# Patient Record
Sex: FEMALE | Race: WHITE | NOT HISPANIC OR LATINO | URBAN - METROPOLITAN AREA
[De-identification: names, ages, dates, MRNs, and addresses within clinical notes are randomized per-mention and may not be internally consistent; named-entity substitution may affect disease eponyms.]

---

## 2017-06-30 ENCOUNTER — OUTPATIENT (OUTPATIENT)
Dept: OUTPATIENT SERVICES | Facility: HOSPITAL | Age: 67
LOS: 1 days | End: 2017-06-30

## 2017-06-30 ENCOUNTER — APPOINTMENT (OUTPATIENT)
Dept: NUCLEAR MEDICINE | Facility: CLINIC | Age: 67
End: 2017-06-30

## 2017-06-30 DIAGNOSIS — Z00.8 ENCOUNTER FOR OTHER GENERAL EXAMINATION: ICD-10-CM

## 2017-08-15 ENCOUNTER — RECORD ABSTRACTING (OUTPATIENT)
Age: 67
End: 2017-08-15

## 2017-08-15 DIAGNOSIS — E78.5 HYPERLIPIDEMIA, UNSPECIFIED: ICD-10-CM

## 2017-08-15 DIAGNOSIS — Z92.89 PERSONAL HISTORY OF OTHER MEDICAL TREATMENT: ICD-10-CM

## 2017-08-15 RX ORDER — MULTIVITAMIN
TABLET ORAL
Refills: 0 | Status: ACTIVE | COMMUNITY

## 2017-08-15 RX ORDER — POTASSIUM CHLORIDE 750 MG/1
10 TABLET, EXTENDED RELEASE ORAL
Refills: 0 | Status: ACTIVE | COMMUNITY

## 2017-08-15 RX ORDER — ROSUVASTATIN CALCIUM 10 MG/1
10 TABLET, FILM COATED ORAL
Refills: 0 | Status: ACTIVE | COMMUNITY

## 2017-08-15 RX ORDER — ASPIRIN 81 MG/1
81 TABLET, COATED ORAL
Refills: 0 | Status: ACTIVE | COMMUNITY

## 2017-08-15 RX ORDER — LOSARTAN POTASSIUM 100 MG/1
100 TABLET, FILM COATED ORAL
Refills: 0 | Status: ACTIVE | COMMUNITY

## 2017-09-21 ENCOUNTER — APPOINTMENT (OUTPATIENT)
Dept: NEUROLOGY | Facility: CLINIC | Age: 67
End: 2017-09-21
Payer: MEDICARE

## 2017-09-21 VITALS
DIASTOLIC BLOOD PRESSURE: 70 MMHG | WEIGHT: 293 LBS | SYSTOLIC BLOOD PRESSURE: 140 MMHG | BODY MASS INDEX: 43.4 KG/M2 | HEIGHT: 69 IN

## 2017-09-21 DIAGNOSIS — R25.1 TREMOR, UNSPECIFIED: ICD-10-CM

## 2017-09-21 DIAGNOSIS — I10 ESSENTIAL (PRIMARY) HYPERTENSION: ICD-10-CM

## 2017-09-21 DIAGNOSIS — Z86.39 PERSONAL HISTORY OF OTHER ENDOCRINE, NUTRITIONAL AND METABOLIC DISEASE: ICD-10-CM

## 2017-09-21 PROCEDURE — 99213 OFFICE O/P EST LOW 20 MIN: CPT

## 2018-01-18 ENCOUNTER — APPOINTMENT (OUTPATIENT)
Dept: NEUROLOGY | Facility: CLINIC | Age: 68
End: 2018-01-18

## 2021-06-07 ENCOUNTER — APPOINTMENT (OUTPATIENT)
Dept: ORTHOPEDIC SURGERY | Facility: CLINIC | Age: 71
End: 2021-06-07
Payer: MEDICARE

## 2021-06-07 VITALS
BODY MASS INDEX: 43.4 KG/M2 | HEIGHT: 69 IN | DIASTOLIC BLOOD PRESSURE: 79 MMHG | HEART RATE: 75 BPM | SYSTOLIC BLOOD PRESSURE: 137 MMHG | WEIGHT: 293 LBS

## 2021-06-07 DIAGNOSIS — M17.12 UNILATERAL PRIMARY OSTEOARTHRITIS, LEFT KNEE: ICD-10-CM

## 2021-06-07 PROCEDURE — 99204 OFFICE O/P NEW MOD 45 MIN: CPT

## 2021-06-07 PROCEDURE — 73562 X-RAY EXAM OF KNEE 3: CPT | Mod: 50

## 2021-06-07 RX ORDER — (INSULIN DEGLUDEC AND LIRAGLUTIDE) 100; 3.6 [IU]/ML; MG/ML
100-3.6 INJECTION, SOLUTION SUBCUTANEOUS
Qty: 45 | Refills: 0 | Status: ACTIVE | COMMUNITY
Start: 2021-01-13

## 2021-06-07 RX ORDER — EMPAGLIFLOZIN 25 MG/1
25 TABLET, FILM COATED ORAL
Qty: 90 | Refills: 0 | Status: ACTIVE | COMMUNITY
Start: 2021-03-01

## 2021-06-07 RX ORDER — FLUCONAZOLE 100 MG/1
100 TABLET ORAL
Qty: 7 | Refills: 0 | Status: ACTIVE | COMMUNITY
Start: 2021-05-25

## 2021-06-07 RX ORDER — CLOBETASOL PROPIONATE 0.5 MG/G
0.05 AEROSOL, FOAM TOPICAL
Qty: 50 | Refills: 0 | Status: ACTIVE | COMMUNITY
Start: 2021-05-21

## 2021-06-07 RX ORDER — FUROSEMIDE 40 MG/1
40 TABLET ORAL
Qty: 90 | Refills: 0 | Status: ACTIVE | COMMUNITY
Start: 2021-04-28

## 2021-06-07 RX ORDER — POTASSIUM CHLORIDE 750 MG/1
10 TABLET, FILM COATED, EXTENDED RELEASE ORAL
Qty: 90 | Refills: 0 | Status: ACTIVE | COMMUNITY
Start: 2021-04-12

## 2021-06-07 RX ORDER — PEN NEEDLE, DIABETIC 29 G X1/2"
32G X 4 MM NEEDLE, DISPOSABLE MISCELLANEOUS
Qty: 100 | Refills: 0 | Status: ACTIVE | COMMUNITY
Start: 2021-05-04

## 2021-09-13 ENCOUNTER — APPOINTMENT (OUTPATIENT)
Dept: ORTHOPEDIC SURGERY | Facility: CLINIC | Age: 71
End: 2021-09-13

## 2023-05-15 ENCOUNTER — OUTPATIENT (OUTPATIENT)
Dept: OUTPATIENT SERVICES | Facility: HOSPITAL | Age: 73
LOS: 1 days | End: 2023-05-15
Payer: MEDICARE

## 2023-05-15 DIAGNOSIS — G47.33 OBSTRUCTIVE SLEEP APNEA (ADULT) (PEDIATRIC): ICD-10-CM

## 2023-05-15 PROCEDURE — 95810 POLYSOM 6/> YRS 4/> PARAM: CPT | Mod: 26

## 2023-05-15 PROCEDURE — 95810 POLYSOM 6/> YRS 4/> PARAM: CPT

## 2023-06-14 ENCOUNTER — APPOINTMENT (OUTPATIENT)
Dept: ORTHOPEDIC SURGERY | Facility: CLINIC | Age: 73
End: 2023-06-14
Payer: MEDICARE

## 2023-06-14 VITALS — HEART RATE: 73 BPM | SYSTOLIC BLOOD PRESSURE: 141 MMHG | DIASTOLIC BLOOD PRESSURE: 74 MMHG

## 2023-06-14 PROCEDURE — 73562 X-RAY EXAM OF KNEE 3: CPT | Mod: 26,RT

## 2023-06-14 PROCEDURE — 99214 OFFICE O/P EST MOD 30 MIN: CPT

## 2023-06-16 NOTE — PHYSICAL EXAM
[Antalgic] : antalgic [Cane] : ambulates with cane [de-identified] : GENERAL APPEARANCE: Well nourished and hydrated, pleasant, alert, and oriented x 3. Appears their stated age. \par HEENT: Normocephalic, extraocular eye motion intact. Nasal septum midline. Oral cavity clear. External auditory canal clear. \par RESPIRATORY: Breath sounds clear and audible in all lobes. No wheezing, No accessory muscle use.\par CARDIOVASCULAR: No apparent abnormalities. No lower leg edema. No varicosities. Pedal pulses are palpable.\par NEUROLOGIC: Sensation is normal, no muscle weakness in the upper or lower extremities.\par DERMATOLOGIC: No apparent skin lesions, moist, warm, no rash.\par SPINE: Cervical spine appears normal and moves freely; thoracic spine appears normal and moves freely; lumbosacral spine appears normal and moves freely, normal, nontender.\par MUSCULOSKELETAL: Hands, wrists, and elbows are normal and move freely, shoulders are normal and move freely. \par Musculoskeletal\par 5/5 motor strength in bilateral lower extremities. Sensory: Intact in bilateral lower extremities. DTRs: Biceps, brachioradialis, triceps, patellar, ankle and plantar 2+ and symmetric bilaterally. Pulses: dorsalis pedis, posterior tibial, femoral, popliteal, and radial 2+ and symmetric bilaterally. \par Constitutional: Alert and in no acute distress, but well-appearing. \par  [de-identified] : right knee exam showed varus alligment, medial joint line tenderness and pain. ROM is 0-100 degree\par  no effusion  [de-identified] : 3 views right knee x-rays demonstrate medial compartment bone-on-bone osteoarthritis

## 2023-06-16 NOTE — DISCUSSION/SUMMARY
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  She failed 2 injections that has been done in another practice.  She is beginning to have a mechanical symptoms of severe osteoarthritis she is walking with a cane.  She reports significantly decreased quality of life she is a candidate for right knee replacement and she is interested.  patient is anticipating gastric sleeve surgery soon for weight loss which I think is the right thing to do to decrease the risk for surgical complication.  Patient will call our office to schedule right total knee arthroplasty when she is recovered from weight loss surgery  cleared by GI.  Patient also has history of diabetes and she is aware to maintain her A1c less than 8.0 to proceed with right total knee arthroplasty.  She deferred injection today since it has failed  providing relief in the past.\par She will continue with over-the-counter pain medications ice and topical pain cream.  She is allowed to continue with low impact exercise .\par We will see her back 2 weeks prior to her surgery.\par \par The patient is a  73 year individual with end stage arthritis of their right knee joint. Based upon the patient's continued symptoms and failure to respond to conservative treatment , including Tylenol and anti-inflammatories, physical therapy, activity modification , local modalities such as apply ice or heat, and injections. I have recommended a total knee arthroplasty for this patient. A long discussion took place with the patient describing what a total joint replacement is and what the procedure would entail. A total knee arthroplasty model, similar to the implant that was used during the operation, was utilized to demonstrate and to discuss the various bearing surfaces of the implants. The hospitalization and post-operative care and rehabilitation were also discussed. The use of perioperative antibiotics and DVT prophylaxis were discussed. The risk, benefits and alternatives to a surgical intervention were discussed at length with the patient. The patient was also advised of risks related to the medical comorbidities, elevated body mass index (BMI), and smoking where applicable. We discussed how to reduce modifiable risk factors and encouraged smoking cessation were applicable.. A lengthy discussion took place to review the most common complications including but not limited to: deep vein thrombosis, pulmonary embolus, heart attack, stroke, infection, wound breakdown, numbness, damage to nerves, tendon, muscles, arteries or other blood vessels, death and other possible complications from anesthesia. The patient was told that we will take steps to minimize these risks by using sterile technique, antibiotics and DVT prophylaxis when appropriate and follow the patient postoperatively in the office setting to monitor progress. The possibility of recurrent pain, no improvement in pain and actual worsening of pain were also discussed with the patient.\par The discharge plan of care focused on the patient going home following surgery. The patient was encouraged to make the necessary arrangements to have someone stay with them when they are discharged home. Following discharge, a home care nurse was to the patient. The home care nurse would open the patient´s home care case and request home physical therapy services. Home physical therapy was to commence following discharge provided it was appropriate and covered by the health insurance benefit plan. \par The benefits of surgery were discussed with the patient including the potential for improving her current clinical condition through operative intervention. Alternatives to surgical intervention including continued conservative management were also discussed in detail. All questions were answered to the satisfaction of the patient. The treatment plan of care, as well as a model of a total knee arthroplasty equivalent to the one that will be used for their total joint replacement, was shared with the patient. The patient agreed to the plan of care as well as the use of implants in their total joint replacement. \par

## 2023-06-16 NOTE — HISTORY OF PRESENT ILLNESS
[Pain Location] : pain [Worsening] : worsening [___ yrs] : [unfilled] year(s) ago [6] : a current pain level of 6/10 [7] : a maximum pain level of 7/10 [de-identified] : This is a 73-year-old female with progressively worsening right knee pain.  She complains of right lower leg swelling .\par She began to feel buckling so she is using cane she is afraid of falling.\par She has been getting [facility] which used to help but she is no longer getting pain relief.\par She was seen in our office in 2014 and 2021. \par Patient has history of diabetes.  She is anticipating gastric sleeve surgery for weight loss

## 2023-07-27 RX ORDER — NABUMETONE 500 MG/1
500 TABLET, FILM COATED ORAL
Qty: 60 | Refills: 0 | Status: ACTIVE | COMMUNITY
Start: 2023-07-27 | End: 1900-01-01

## 2023-07-28 ENCOUNTER — APPOINTMENT (OUTPATIENT)
Dept: PULMONOLOGY | Facility: CLINIC | Age: 73
End: 2023-07-28
Payer: MEDICARE

## 2023-07-28 VITALS
HEART RATE: 64 BPM | SYSTOLIC BLOOD PRESSURE: 122 MMHG | OXYGEN SATURATION: 97 % | WEIGHT: 293 LBS | RESPIRATION RATE: 16 BRPM | DIASTOLIC BLOOD PRESSURE: 76 MMHG | BODY MASS INDEX: 44.41 KG/M2 | HEIGHT: 68 IN

## 2023-07-28 DIAGNOSIS — Z80.0 FAMILY HISTORY OF MALIGNANT NEOPLASM OF DIGESTIVE ORGANS: ICD-10-CM

## 2023-07-28 DIAGNOSIS — Z82.49 FAMILY HISTORY OF ISCHEMIC HEART DISEASE AND OTHER DISEASES OF THE CIRCULATORY SYSTEM: ICD-10-CM

## 2023-07-28 DIAGNOSIS — Z80.3 FAMILY HISTORY OF MALIGNANT NEOPLASM OF BREAST: ICD-10-CM

## 2023-07-28 DIAGNOSIS — S42.302A UNSPECIFIED FRACTURE OF SHAFT OF HUMERUS, LEFT ARM, INITIAL ENCOUNTER FOR CLOSED FRACTURE: ICD-10-CM

## 2023-07-28 DIAGNOSIS — Z63.4 DISAPPEARANCE AND DEATH OF FAMILY MEMBER: ICD-10-CM

## 2023-07-28 DIAGNOSIS — Z01.811 ENCOUNTER FOR PREPROCEDURAL RESPIRATORY EXAMINATION: ICD-10-CM

## 2023-07-28 DIAGNOSIS — Z86.16 PERSONAL HISTORY OF COVID-19: ICD-10-CM

## 2023-07-28 DIAGNOSIS — Z86.79 PERSONAL HISTORY OF OTHER DISEASES OF THE CIRCULATORY SYSTEM: ICD-10-CM

## 2023-07-28 DIAGNOSIS — Z87.891 PERSONAL HISTORY OF NICOTINE DEPENDENCE: ICD-10-CM

## 2023-07-28 PROCEDURE — 99204 OFFICE O/P NEW MOD 45 MIN: CPT

## 2023-07-28 RX ORDER — DOCUSATE CALCIUM 240 MG
CAPSULE ORAL
Refills: 0 | Status: ACTIVE | COMMUNITY

## 2023-07-28 RX ORDER — GLIMEPIRIDE 2 MG/1
2 TABLET ORAL
Refills: 0 | Status: ACTIVE | COMMUNITY

## 2023-07-28 RX ORDER — METOPROLOL TARTRATE 75 MG/1
TABLET, FILM COATED ORAL
Refills: 0 | Status: ACTIVE | COMMUNITY

## 2023-07-28 SDOH — SOCIAL STABILITY - SOCIAL INSECURITY: DISSAPEARANCE AND DEATH OF FAMILY MEMBER: Z63.4

## 2023-07-28 NOTE — HISTORY OF PRESENT ILLNESS
[Excessive Daytime Sleepiness] : excessive daytime sleepiness [Sleepy When Sedentary] : sleepy when sedentary [Initial Evaluation] : an initial evaluation of [Excess Weight] : excess weight [Currently Experiencing] : The patient is currently experiencing symptoms. [Dyspnea] : dyspnea [Knee Pain] : knee pain [Low Calorie Diet] : low calorie diet [Fair Compliance] : fair compliance with treatment [Fair Tolerance] : fair tolerance of treatment [Fair Symptom Control] : fair symptom control [Dyslipidemia] : dyslipidemia [Sleep Apnea] : sleep apnea [Diabetes] : diabetes [Hypertension] : hypertension [Coronary Artery Disease] : coronary artery disease [TextBox_4] : Former smoker of 1-2 ppd x 20 years, quit 1983\par S/p Covid infection 11/2021 and again in 2022 but with mild symptoms and did not require therapy.\par H/o MI 4/2022 and was evaluated by cardiology.\par The patient presents for pulmonary evaluation prior to bariatric surgery. \par  [On ___] : performed on [unfilled] [Patient] : the patient [Indication ___] : for an indication of [unfilled] [None] : no new symptoms reported [FreeTextEntry9] : CXR [FreeTextEntry8] : R midlung calcified granuloma [ESS] : 1 [TextBox_11] : 3

## 2023-07-28 NOTE — CONSULT LETTER
[Dear  ___] : Dear  [unfilled], [Consult Letter:] : I had the pleasure of evaluating your patient, [unfilled]. [Please see my note below.] : Please see my note below. [Consult Closing:] : Thank you very much for allowing me to participate in the care of this patient.  If you have any questions, please do not hesitate to contact me. [Sincerely,] : Sincerely, [FreeTextEntry3] : Garcia Lanier MD, FCCP, D. ABSM\par Pulmonary and Sleep Medicine\par [facility] [place]

## 2023-07-28 NOTE — PHYSICAL EXAM
[No Acute Distress] : no acute distress [Normal Appearance] : normal appearance [Supple] : supple [Normal Rate/Rhythm] : normal rate/rhythm [Normal S1, S2] : normal s1, s2 [No Murmurs] : no murmurs [No Resp Distress] : no resp distress [No Acc Muscle Use] : no acc muscle use [Normal Rhythm and Effort] : normal rhythm and effort [Clear to Auscultation Bilaterally] : clear to auscultation bilaterally [No Abnormalities] : no abnormalities [Benign] : benign [Not Tender] : not tender [Soft] : soft [No Clubbing] : no clubbing [1+ Pitting] : 1+ pitting [No Focal Deficits] : no focal deficits [Oriented x3] : oriented x3

## 2023-07-28 NOTE — REASON FOR VISIT
[Initial] : an initial visit [Sleep Apnea] : sleep apnea [Shortness of Breath] : shortness of breath [Pre-op Risk Stratification] : pre-op risk stratification [Obesity] : obesity [TextBox_44] : prior Covid infection, smoking hx

## 2023-07-28 NOTE — DISCUSSION/SUMMARY
[FreeTextEntry1] : \par #1. Will schedule PFTs in near future to assess lung function \par #2. The patient does not appear to require chronic BD therapy at this time\par #3. Diet and exercise for weight loss\par #4. SOBOE is likely at least somewhat related to weight or deconditioning \par #5. CXR from 5/30/23 was clear with R midlung calcified granuloma; repeat prn\par #6. 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 Replace PAP equipment as needed; ordered 7/28/23\par #8. Further pre-op recommendations will be forthcoming pending the above w/u\par #9. Pt had both Covid vaccines and boosters; s/p Covid infection\par #10. F/u as soon as can be scheduled with compliance and PFTs\par \par The patient expressed understanding and agreement with the above recommendations/plan and accepts responsibility to be compliant with recommended testing, therapies, and f/u visits.\par All relevant questions and concerns were addressed.

## 2023-07-28 NOTE — REVIEW OF SYSTEMS
[SOB on Exertion] : sob on exertion [Edema] : edema [Diabetes] : diabetes [Obesity] : obesity [Negative] : Psychiatric [TextBox_94] : Knee pain

## 2023-07-31 ENCOUNTER — APPOINTMENT (OUTPATIENT)
Dept: ORTHOPEDIC SURGERY | Facility: CLINIC | Age: 73
End: 2023-07-31

## 2023-08-07 ENCOUNTER — OUTPATIENT (OUTPATIENT)
Dept: OUTPATIENT SERVICES | Facility: HOSPITAL | Age: 73
LOS: 1 days | End: 2023-08-07
Payer: MEDICARE

## 2023-08-07 VITALS
WEIGHT: 293 LBS | SYSTOLIC BLOOD PRESSURE: 140 MMHG | TEMPERATURE: 98 F | RESPIRATION RATE: 16 BRPM | HEART RATE: 79 BPM | HEIGHT: 68 IN | OXYGEN SATURATION: 97 % | DIASTOLIC BLOOD PRESSURE: 75 MMHG

## 2023-08-07 DIAGNOSIS — I10 ESSENTIAL (PRIMARY) HYPERTENSION: ICD-10-CM

## 2023-08-07 DIAGNOSIS — E11.9 TYPE 2 DIABETES MELLITUS WITHOUT COMPLICATIONS: ICD-10-CM

## 2023-08-07 DIAGNOSIS — Z98.890 OTHER SPECIFIED POSTPROCEDURAL STATES: Chronic | ICD-10-CM

## 2023-08-07 DIAGNOSIS — M19.90 UNSPECIFIED OSTEOARTHRITIS, UNSPECIFIED SITE: ICD-10-CM

## 2023-08-07 DIAGNOSIS — M17.11 UNILATERAL PRIMARY OSTEOARTHRITIS, RIGHT KNEE: ICD-10-CM

## 2023-08-07 DIAGNOSIS — Z01.818 ENCOUNTER FOR OTHER PREPROCEDURAL EXAMINATION: ICD-10-CM

## 2023-08-07 DIAGNOSIS — Z29.9 ENCOUNTER FOR PROPHYLACTIC MEASURES, UNSPECIFIED: ICD-10-CM

## 2023-08-07 LAB
A1C WITH ESTIMATED AVERAGE GLUCOSE RESULT: 8.2 % — HIGH (ref 4–5.6)
ANION GAP SERPL CALC-SCNC: 9 MMOL/L — SIGNIFICANT CHANGE UP (ref 5–17)
APTT BLD: 33 SEC — SIGNIFICANT CHANGE UP (ref 24.5–35.6)
BASOPHILS # BLD AUTO: 0.05 K/UL — SIGNIFICANT CHANGE UP (ref 0–0.2)
BASOPHILS NFR BLD AUTO: 0.8 % — SIGNIFICANT CHANGE UP (ref 0–2)
BLD GP AB SCN SERPL QL: SIGNIFICANT CHANGE UP
BUN SERPL-MCNC: 21.9 MG/DL — HIGH (ref 8–20)
CALCIUM SERPL-MCNC: 9 MG/DL — SIGNIFICANT CHANGE UP (ref 8.4–10.5)
CHLORIDE SERPL-SCNC: 101 MMOL/L — SIGNIFICANT CHANGE UP (ref 96–108)
CO2 SERPL-SCNC: 28 MMOL/L — SIGNIFICANT CHANGE UP (ref 22–29)
CREAT SERPL-MCNC: 0.92 MG/DL — SIGNIFICANT CHANGE UP (ref 0.5–1.3)
EGFR: 65 ML/MIN/1.73M2 — SIGNIFICANT CHANGE UP
EOSINOPHIL # BLD AUTO: 0.13 K/UL — SIGNIFICANT CHANGE UP (ref 0–0.5)
EOSINOPHIL NFR BLD AUTO: 2.1 % — SIGNIFICANT CHANGE UP (ref 0–6)
ESTIMATED AVERAGE GLUCOSE: 189 MG/DL — HIGH (ref 68–114)
GLUCOSE SERPL-MCNC: 378 MG/DL — HIGH (ref 70–99)
HCT VFR BLD CALC: 45.3 % — HIGH (ref 34.5–45)
HGB BLD-MCNC: 14.5 G/DL — SIGNIFICANT CHANGE UP (ref 11.5–15.5)
IMM GRANULOCYTES NFR BLD AUTO: 0.3 % — SIGNIFICANT CHANGE UP (ref 0–0.9)
INR BLD: 1.1 RATIO — SIGNIFICANT CHANGE UP (ref 0.85–1.18)
LYMPHOCYTES # BLD AUTO: 1.44 K/UL — SIGNIFICANT CHANGE UP (ref 1–3.3)
LYMPHOCYTES # BLD AUTO: 22.8 % — SIGNIFICANT CHANGE UP (ref 13–44)
MCHC RBC-ENTMCNC: 29.7 PG — SIGNIFICANT CHANGE UP (ref 27–34)
MCHC RBC-ENTMCNC: 32 GM/DL — SIGNIFICANT CHANGE UP (ref 32–36)
MCV RBC AUTO: 92.6 FL — SIGNIFICANT CHANGE UP (ref 80–100)
MONOCYTES # BLD AUTO: 0.47 K/UL — SIGNIFICANT CHANGE UP (ref 0–0.9)
MONOCYTES NFR BLD AUTO: 7.4 % — SIGNIFICANT CHANGE UP (ref 2–14)
MRSA PCR RESULT.: SIGNIFICANT CHANGE UP
NEUTROPHILS # BLD AUTO: 4.21 K/UL — SIGNIFICANT CHANGE UP (ref 1.8–7.4)
NEUTROPHILS NFR BLD AUTO: 66.6 % — SIGNIFICANT CHANGE UP (ref 43–77)
PLATELET # BLD AUTO: 135 K/UL — LOW (ref 150–400)
POTASSIUM SERPL-MCNC: 4.7 MMOL/L — SIGNIFICANT CHANGE UP (ref 3.5–5.3)
POTASSIUM SERPL-SCNC: 4.7 MMOL/L — SIGNIFICANT CHANGE UP (ref 3.5–5.3)
PROTHROM AB SERPL-ACNC: 12.2 SEC — SIGNIFICANT CHANGE UP (ref 9.5–13)
RBC # BLD: 4.89 M/UL — SIGNIFICANT CHANGE UP (ref 3.8–5.2)
RBC # FLD: 14 % — SIGNIFICANT CHANGE UP (ref 10.3–14.5)
S AUREUS DNA NOSE QL NAA+PROBE: SIGNIFICANT CHANGE UP
SODIUM SERPL-SCNC: 138 MMOL/L — SIGNIFICANT CHANGE UP (ref 135–145)
WBC # BLD: 6.32 K/UL — SIGNIFICANT CHANGE UP (ref 3.8–10.5)
WBC # FLD AUTO: 6.32 K/UL — SIGNIFICANT CHANGE UP (ref 3.8–10.5)

## 2023-08-07 PROCEDURE — 93005 ELECTROCARDIOGRAM TRACING: CPT

## 2023-08-07 PROCEDURE — 93010 ELECTROCARDIOGRAM REPORT: CPT

## 2023-08-07 PROCEDURE — G0463: CPT

## 2023-08-07 NOTE — H&P PST ADULT - NSANTHOSAYNRD_GEN_A_CORE
No. CABRERA screening performed.  STOP BANG Legend: 0-2 = LOW Risk; 3-4 = INTERMEDIATE Risk; 5-8 = HIGH Risk Yes

## 2023-08-07 NOTE — H&P PST ADULT - ASSESSMENT
CAPRINI SCORE    AGE RELATED RISK FACTORS                                                             [ ] Age 41-60 years                                            (1 Point)  [ ] Age: 61-74 years                                           (2 Points)                 [ ] Age= 75 years                                                (3 Points)             DISEASE RELATED RISK FACTORS                                                       [ ] Edema in the lower extremities                 (1 Point)                     [ ] Varicose veins                                               (1 Point)                                 [ ] BMI > 25 Kg/m2                                            (1 Point)                                  [ ] Serious infection (ie PNA)                            (1 Point)                     [ ] Lung disease ( COPD, Emphysema)            (1 Point)                                                                          [ ] Acute myocardial infarction                         (1 Point)                  [ ] Congestive heart failure (in the previous month)  (1 Point)         [ ] Inflammatory bowel disease                            (1 Point)                  [ ] Central venous access, PICC or Port               (2 points)       (within the last month)                                                                [ ] Stroke (in the previous month)                        (5 Points)    [ ] Previous or present malignancy                       (2 points)                                                                                                                                                         HEMATOLOGY RELATED FACTORS                                                         [ ] Prior episodes of VTE                                     (3 Points)                     [ ] Positive family history for VTE                      (3 Points)                  [ ] Prothrombin 86285 A                                     (3 Points)                     [ ] Factor V Leiden                                                (3 Points)                        [ ] Lupus anticoagulants                                      (3 Points)                                                           [ ] Anticardiolipin antibodies                              (3 Points)                                                       [ ] High homocysteine in the blood                   (3 Points)                                             [ ] Other congenital or acquired thrombophilia      (3 Points)                                                [ ] Heparin induced thrombocytopenia                  (3 Points)                                        MOBILITY RELATED FACTORS  [ ] Bed rest                                                         (1 Point)  [ ] Plaster cast                                                    (2 points)  [ ] Bed bound for more than 72 hours           (2 Points)    GENDER SPECIFIC FACTORS  [ ] Pregnancy or had a baby within the last month   (1 Point)  [ ] Post-partum < 6 weeks                                   (1 Point)  [ ] Hormonal therapy  or oral contraception   (1 Point)  [ ] History of pregnancy complications              (1 point)  [ ] Unexplained or recurrent              (1 Point)    OTHER RISK FACTORS                                           (1 Point)  [ ] BMI >40, smoking, diabetes requiring insulin, chemotherapy  blood transfusions and length of surgery over 2 hours    SURGERY RELATED RISK FACTORS  [ ]  Section within the last month     (1 Point)  [ ] Minor surgery                                                  (1 Point)  [ ] Arthroscopic surgery                                       (2 Points)  [ ] Planned major surgery lasting more            (2 Points)      than 45 minutes     [ ] Elective hip or knee joint replacement       (5 points)       surgery                                                TRAUMA RELATED RISK FACTORS  [ ] Fracture of the hip, pelvis, or leg                       (5 Points)  [ ] Spinal cord injury resulting in paralysis             (5 points)       (in the previous month)    [ ] Paralysis  (less than 1 month)                             (5 Points)  [ ] Multiple Trauma within 1 month                        (5 Points)    Total Score [        ]    Caprini Score 0-2: Low Risk, NO VTE prophylaxis required for most patients, encourage ambulation  Caprini Score 3-6: Moderate Risk , pharmacologic VTE prophylaxis is indicated for most patients (in the absence of contraindications)  Caprini Score Greater than or =7: High risk, pharmocologic VTE prophylaxis indicated for most patients (in the absence of contraindications)                              OPIOID RISK TOOL    AIMEE EACH BOX THAT APPLIES AND ADD TOTALS AT THE END    FAMILY HISTORY OF SUBSTANCE ABUSE                 FEMALE         MALE                                                Alcohol                             [  ]1 pt          [  ]3pts                                               Illegal Durgs                     [  ]2 pts        [  ]3pts                                               Rx Drugs                           [  ]4 pts        [  ]4 pts    PERSONAL HISTORY OF SUBSTANCE ABUSE                                                                                          Alcohol                             [  ]3 pts       [  ]3 pts                                               Illegal Drugs                     [  ]4 pts        [  ]4 pts                                               Rx Drugs                           [  ]5 pts        [  ]5 pts    AGE BETWEEN 16-45 YEARS                                      [  ]1 pt         [  ]1 pt    HISTORY OF PREADOLESCENT   SEXUAL ABUSE                                                             [  ]3 pts        [  ]0pts    PSYCHOLOGICAL DISEASE                     ADD, OCD, Bipolar, Schizophrenia        [  ]2 pts         [  ]2 pts                      Depression                                               [  ]1 pt           [  ]1 pt           SCORING TOTAL   (add numbers and type here)              (***)                                     A score of 3 or lower indicated LOW risk for future opioid abuse  A score of 4 to 7 indicated moderate risk for future opioid abuse  A score of 8 or higher indicates a high risk for opioid abuse     Pt is a 73 year old female seen pre-op today for right knee total joint replacement for unilateral primary osteoarthritis right knee. Pt reports longstanding bilateral knee pain for approximately 8 years (right knee pain worse than left knee pain). Pt reports sharp right knee pain 8/10 at maximum and 3/10 at rest. Pt denies trauma and falls. Reports the pain aggravated with ambulation, climbing up the stairs, weight bearin, activities. Pt requires the use of a cane to assist with mobility and stability. Pt reports prior conservative treatment of Physical therapy and steroid injection with mild relief. Pt states that she currently takes PO Tylenol to manage her pain with mild relief. Pt past medical history includes osteoarthritis of B/L knees, HLD, HTN, diabetes, MI 22, cardiac catheterization (no stent placement). Pt denies fevers, chills  and night sweat. Surgery scheduled for 2023 with Dr. Kerns. Pending medical and cardiac clearance    CAPRINI SCORE    AGE RELATED RISK FACTORS                                                             [ ] Age 41-60 years                                            (1 Point)  [ ] Age: 61-74 years                                           (2 Points)                 [ ] Age= 75 years                                                (3 Points)             DISEASE RELATED RISK FACTORS                                                       [ ] Edema in the lower extremities                 (1 Point)                     [ ] Varicose veins                                               (1 Point)                                 [ ] BMI > 25 Kg/m2                                            (1 Point)                                  [ ] Serious infection (ie PNA)                            (1 Point)                     [ ] Lung disease ( COPD, Emphysema)            (1 Point)                                                                          [ ] Acute myocardial infarction                         (1 Point)                  [ ] Congestive heart failure (in the previous month)  (1 Point)         [ ] Inflammatory bowel disease                            (1 Point)                  [ ] Central venous access, PICC or Port               (2 points)       (within the last month)                                                                [ ] Stroke (in the previous month)                        (5 Points)    [ ] Previous or present malignancy                       (2 points)                                                                                                                                                         HEMATOLOGY RELATED FACTORS                                                         [ ] Prior episodes of VTE                                     (3 Points)                     [ ] Positive family history for VTE                      (3 Points)                  [ ] Prothrombin 09007 A                                     (3 Points)                     [ ] Factor V Leiden                                                (3 Points)                        [ ] Lupus anticoagulants                                      (3 Points)                                                           [ ] Anticardiolipin antibodies                              (3 Points)                                                       [ ] High homocysteine in the blood                   (3 Points)                                             [ ] Other congenital or acquired thrombophilia      (3 Points)                                                [ ] Heparin induced thrombocytopenia                  (3 Points)                                        MOBILITY RELATED FACTORS  [ ] Bed rest                                                         (1 Point)  [ ] Plaster cast                                                    (2 points)  [ ] Bed bound for more than 72 hours           (2 Points)    GENDER SPECIFIC FACTORS  [ ] Pregnancy or had a baby within the last month   (1 Point)  [ ] Post-partum < 6 weeks                                   (1 Point)  [ ] Hormonal therapy  or oral contraception   (1 Point)  [ ] History of pregnancy complications              (1 point)  [ ] Unexplained or recurrent              (1 Point)    OTHER RISK FACTORS                                           (1 Point)  [ ] BMI >40, smoking, diabetes requiring insulin, chemotherapy  blood transfusions and length of surgery over 2 hours    SURGERY RELATED RISK FACTORS  [ ]  Section within the last month     (1 Point)  [ ] Minor surgery                                                  (1 Point)  [ ] Arthroscopic surgery                                       (2 Points)  [ ] Planned major surgery lasting more            (2 Points)      than 45 minutes     [ ] Elective hip or knee joint replacement       (5 points)       surgery                                                TRAUMA RELATED RISK FACTORS  [ ] Fracture of the hip, pelvis, or leg                       (5 Points)  [ ] Spinal cord injury resulting in paralysis             (5 points)       (in the previous month)    [ ] Paralysis  (less than 1 month)                             (5 Points)  [ ] Multiple Trauma within 1 month                        (5 Points)    Total Score [        ]    Caprini Score 0-2: Low Risk, NO VTE prophylaxis required for most patients, encourage ambulation  Caprini Score 3-6: Moderate Risk , pharmacologic VTE prophylaxis is indicated for most patients (in the absence of contraindications)  Caprini Score Greater than or =7: High risk, pharmocologic VTE prophylaxis indicated for most patients (in the absence of contraindications)                              OPIOID RISK TOOL    AIMEE EACH BOX THAT APPLIES AND ADD TOTALS AT THE END    FAMILY HISTORY OF SUBSTANCE ABUSE                 FEMALE         MALE                                                Alcohol                             [  ]1 pt          [  ]3pts                                               Illegal Durgs                     [  ]2 pts        [  ]3pts                                               Rx Drugs                           [  ]4 pts        [  ]4 pts    PERSONAL HISTORY OF SUBSTANCE ABUSE                                                                                          Alcohol                             [  ]3 pts       [  ]3 pts                                               Illegal Drugs                     [  ]4 pts        [  ]4 pts                                               Rx Drugs                           [  ]5 pts        [  ]5 pts    AGE BETWEEN 16-45 YEARS                                      [  ]1 pt         [  ]1 pt    HISTORY OF PREADOLESCENT   SEXUAL ABUSE                                                             [  ]3 pts        [  ]0pts    PSYCHOLOGICAL DISEASE                     ADD, OCD, Bipolar, Schizophrenia        [  ]2 pts         [  ]2 pts                      Depression                                               [  ]1 pt           [  ]1 pt           SCORING TOTAL   (add numbers and type here)              (***)                                     A score of 3 or lower indicated LOW risk for future opioid abuse  A score of 4 to 7 indicated moderate risk for future opioid abuse  A score of 8 or higher indicates a high risk for opioid abuse

## 2023-08-07 NOTE — H&P PST ADULT - SKIN
warm and dry/color normal/normal/no rashes/no ulcers warm and dry/color normal/no rashes/no ulcers warm and dry/color normal

## 2023-08-07 NOTE — H&P PST ADULT - NSICDXPASTMEDICALHX_GEN_ALL_CORE_FT
PAST MEDICAL HISTORY:  Diabetes     HTN (hypertension)      PAST MEDICAL HISTORY:  Diabetes     High blood cholesterol level     HTN (hypertension)     Osteoarthritis

## 2023-08-07 NOTE — H&P PST ADULT - HISTORY OF PRESENT ILLNESS
Pt is a 73 year old female seen pre-op today for right knee total joint replacement for unilateral primary osteoarthritis left knee. Pt reports longstanding bilateral knee pain for approximately 8 years (right knee pain worse than left knee pain). Pt reports aching right knee pain 8/10 at maximum and 3/10 at rest. Pt denies trauma and falls. Reports the pain aggravated with ambulation, climbing up the stairs, weight bearing, activities. Pt requires the use of a cane to assist with mobility and stability. Pt reports prior conservative treatment of Physical therapy and steroid injection with mild relief. Pt states that she currently takes PO Tylenol to manage her pain with mild relief. Pt past medical history includes osteoarthritis of both knees, HLD, HTN, anxiety,  Pt denies fevers, chills  and night sweat. Surgery scheduled for 8/24/2023 with Dr. Kerns Pt is a 73 year old female seen pre-op today for right knee total joint replacement for unilateral primary osteoarthritis right knee. Pt reports longstanding bilateral knee pain for approximately 8 years (right knee pain worse than left knee pain). Pt reports sharp right knee pain 8/10 at maximum and 3/10 at rest. Pt denies trauma and falls. Reports the pain aggravated with ambulation, climbing up the stairs, weight bearing, activities. Pt requires the use of a cane to assist with mobility and stability. Pt reports prior conservative treatment of Physical therapy and steroid injection with mild relief. Pt states that she currently takes PO Tylenol to manage her pain with mild relief. Pt past medical history includes osteoarthritis of both knees, HLD, HTN, diabetes, MI 6/6/22, cardiac catheterization (no stent placement). Pt denies fevers, chills  and night sweat. Surgery scheduled for 8/24/2023 with Dr. Kerns Pt is a 73 year old female seen pre-op today for right knee total joint replacement for unilateral primary osteoarthritis right knee. Pt reports longstanding bilateral knee pain for approximately 8 years (right knee pain worse than left knee pain). Pt reports sharp right knee pain 8/10 at maximum and 3/10 at rest. Pt denies trauma and falls. Reports the pain aggravated with ambulation, climbing up the stairs, weight bearin, activities. Pt requires the use of a cane to assist with mobility and stability. Pt reports prior conservative treatment of Physical therapy and steroid injection with mild relief. Pt states that she currently takes PO Tylenol to manage her pain with mild relief. Pt past medical history includes osteoarthritis of B/L knees, HLD, HTN, diabetes, MI 6/6/22, cardiac catheterization (no stent placement). Pt denies fevers, chills  and night sweat. Surgery scheduled for 8/24/2023 with Dr. Kerns. Pending medical and cardiac clearance

## 2023-08-09 ENCOUNTER — APPOINTMENT (OUTPATIENT)
Dept: PULMONOLOGY | Facility: CLINIC | Age: 73
End: 2023-08-09
Payer: MEDICARE

## 2023-08-09 VITALS
DIASTOLIC BLOOD PRESSURE: 64 MMHG | RESPIRATION RATE: 16 BRPM | OXYGEN SATURATION: 91 % | SYSTOLIC BLOOD PRESSURE: 126 MMHG | HEART RATE: 81 BPM

## 2023-08-09 VITALS — WEIGHT: 293 LBS | BODY MASS INDEX: 44.41 KG/M2 | HEIGHT: 68 IN

## 2023-08-09 DIAGNOSIS — R06.02 SHORTNESS OF BREATH: ICD-10-CM

## 2023-08-09 DIAGNOSIS — G47.33 OBSTRUCTIVE SLEEP APNEA (ADULT) (PEDIATRIC): ICD-10-CM

## 2023-08-09 PROBLEM — E78.00 PURE HYPERCHOLESTEROLEMIA, UNSPECIFIED: Chronic | Status: ACTIVE | Noted: 2023-08-07

## 2023-08-09 PROBLEM — M19.90 UNSPECIFIED OSTEOARTHRITIS, UNSPECIFIED SITE: Chronic | Status: ACTIVE | Noted: 2023-08-07

## 2023-08-09 PROBLEM — I10 ESSENTIAL (PRIMARY) HYPERTENSION: Chronic | Status: ACTIVE | Noted: 2023-08-07

## 2023-08-09 PROBLEM — E11.9 TYPE 2 DIABETES MELLITUS WITHOUT COMPLICATIONS: Chronic | Status: ACTIVE | Noted: 2023-08-07

## 2023-08-09 PROCEDURE — 94010 BREATHING CAPACITY TEST: CPT

## 2023-08-09 PROCEDURE — 99215 OFFICE O/P EST HI 40 MIN: CPT | Mod: 25

## 2023-08-09 NOTE — REASON FOR VISIT
[Follow-Up] : a follow-up visit [Pre-op Risk Stratification] : pre-op risk stratification [TextBox_44] : Gastric sleeve

## 2023-08-09 NOTE — HISTORY OF PRESENT ILLNESS
[Obstructive Sleep Apnea] : obstructive sleep apnea [Witnessed Apneas] : witnessed apneas [TextBox_4] : No complaints of cough wheeze or sputum production [Snoring] : no snoring [TextBox_77] : 1674 [TextBox_79] : 6852 [TextBox_81] : 10 [TextBox_83] : 0 [TextBox_100] : 5/12/2023 [TextBox_108] : 13.7 [TextBox_112] : 93.9 [TextBox_116] : 68 [TextBox_120] : REM 60.8

## 2023-08-09 NOTE — CONSULT LETTER
[Dear  ___] : Dear  [unfilled], [Consult Letter:] : I had the pleasure of evaluating your patient, [unfilled]. [Please see my note below.] : Please see my note below. [Consult Closing:] : Thank you very much for allowing me to participate in the care of this patient.  If you have any questions, please do not hesitate to contact me. [Sincerely,] : Sincerely, [FreeTextEntry3] : Sean Quintanilla MD FCCP Pulmonary/Critical Care/Sleep Medicine Department of Internal Medicine  [facility]

## 2023-08-09 NOTE — DISCUSSION/SUMMARY
[Obstructive Sleep Apnea] : obstructive sleep apnea [Mild] : mild [Alcohol Avoidance] : alcohol avoidance [Sedative Avoidance] : sedative avoidance [Weight Loss Program] : weight loss program [Patient] : discussed with the patient [de-identified] : severe in REM [de-identified] : CPAP was initiated with full explanation of the physiology behind treatment, compliance requirements and care of equipment. [FreeTextEntry1] : Prolonged hypoxemia most likely due to obstructive sleep apnea.  Patient will probably need an overnight oximetry following successful institution of her CPAP.  Clearance pending the above

## 2023-08-15 ENCOUNTER — NON-APPOINTMENT (OUTPATIENT)
Age: 73
End: 2023-08-15

## 2023-08-24 ENCOUNTER — APPOINTMENT (OUTPATIENT)
Dept: ORTHOPEDIC SURGERY | Facility: HOSPITAL | Age: 73
End: 2023-08-24

## 2023-09-15 ENCOUNTER — APPOINTMENT (OUTPATIENT)
Dept: ORTHOPEDIC SURGERY | Facility: CLINIC | Age: 73
End: 2023-09-15

## 2023-09-28 ENCOUNTER — APPOINTMENT (OUTPATIENT)
Dept: PULMONOLOGY | Facility: CLINIC | Age: 73
End: 2023-09-28

## 2023-11-01 ENCOUNTER — APPOINTMENT (OUTPATIENT)
Dept: ORTHOPEDIC SURGERY | Facility: CLINIC | Age: 73
End: 2023-11-01

## 2023-11-07 NOTE — H&P PST ADULT - GASTROINTESTINAL
Called the pt, she stated she faxed her continuity of care to [facility] 4 days ago. The pt told me that we were supposed to call and check on the status and expedite it. I then called [facility] and spoke with a representative that checked their work queues looking for everything that was faxed in, but they stated that they have not received anything from the pt. I called the pt back and informed her of this and provided her with the number that I called. Pt will call back once she calls them.     María Kc    normal/soft/nontender/nondistended/normal active bowel sounds negative

## 2023-11-22 ENCOUNTER — APPOINTMENT (OUTPATIENT)
Dept: PULMONOLOGY | Facility: CLINIC | Age: 73
End: 2023-11-22
Payer: MEDICARE

## 2023-11-22 ENCOUNTER — APPOINTMENT (OUTPATIENT)
Dept: ORTHOPEDIC SURGERY | Facility: CLINIC | Age: 73
End: 2023-11-22
Payer: MEDICARE

## 2023-11-22 VITALS
WEIGHT: 293 LBS | BODY MASS INDEX: 44.41 KG/M2 | DIASTOLIC BLOOD PRESSURE: 72 MMHG | HEART RATE: 74 BPM | HEIGHT: 68 IN | SYSTOLIC BLOOD PRESSURE: 147 MMHG

## 2023-11-22 VITALS
OXYGEN SATURATION: 95 % | RESPIRATION RATE: 16 BRPM | DIASTOLIC BLOOD PRESSURE: 60 MMHG | HEART RATE: 71 BPM | SYSTOLIC BLOOD PRESSURE: 120 MMHG

## 2023-11-22 VITALS — WEIGHT: 293 LBS | BODY MASS INDEX: 48.05 KG/M2

## 2023-11-22 DIAGNOSIS — Z01.818 ENCOUNTER FOR OTHER PREPROCEDURAL EXAMINATION: ICD-10-CM

## 2023-11-22 DIAGNOSIS — G47.33 OBSTRUCTIVE SLEEP APNEA (ADULT) (PEDIATRIC): ICD-10-CM

## 2023-11-22 DIAGNOSIS — M17.11 UNILATERAL PRIMARY OSTEOARTHRITIS, RIGHT KNEE: ICD-10-CM

## 2023-11-22 DIAGNOSIS — Z71.89 OTHER SPECIFIED COUNSELING: ICD-10-CM

## 2023-11-22 DIAGNOSIS — E66.01 MORBID (SEVERE) OBESITY DUE TO EXCESS CALORIES: ICD-10-CM

## 2023-11-22 DIAGNOSIS — E11.9 TYPE 2 DIABETES MELLITUS W/OUT COMPLICATIONS: ICD-10-CM

## 2023-11-22 PROCEDURE — 99214 OFFICE O/P EST MOD 30 MIN: CPT

## 2023-11-22 RX ORDER — SODIUM SULFATE, POTASSIUM SULFATE, MAGNESIUM SULFATE 17.5; 3.13; 1.6 G/ML; G/ML; G/ML
17.5-3.13-1.6 SOLUTION, CONCENTRATE ORAL
Qty: 354 | Refills: 0 | Status: COMPLETED | COMMUNITY
Start: 2021-01-28 | End: 2023-11-22

## 2023-11-22 RX ORDER — CANAGLIFLOZIN 300 MG/1
300 TABLET, FILM COATED ORAL
Refills: 0 | Status: COMPLETED | COMMUNITY
End: 2023-11-22

## 2023-12-10 ENCOUNTER — NON-APPOINTMENT (OUTPATIENT)
Age: 73
End: 2023-12-10

## 2024-01-12 ENCOUNTER — NON-APPOINTMENT (OUTPATIENT)
Age: 74
End: 2024-01-12

## 2024-03-15 ENCOUNTER — OUTPATIENT (OUTPATIENT)
Dept: OUTPATIENT SERVICES | Facility: HOSPITAL | Age: 74
LOS: 1 days | End: 2024-03-15
Payer: MEDICARE

## 2024-03-15 VITALS
WEIGHT: 275.58 LBS | TEMPERATURE: 98 F | OXYGEN SATURATION: 98 % | HEART RATE: 69 BPM | HEIGHT: 65 IN | DIASTOLIC BLOOD PRESSURE: 75 MMHG | SYSTOLIC BLOOD PRESSURE: 120 MMHG | RESPIRATION RATE: 16 BRPM

## 2024-03-15 DIAGNOSIS — Z01.818 ENCOUNTER FOR OTHER PREPROCEDURAL EXAMINATION: ICD-10-CM

## 2024-03-15 DIAGNOSIS — Z29.9 ENCOUNTER FOR PROPHYLACTIC MEASURES, UNSPECIFIED: ICD-10-CM

## 2024-03-15 DIAGNOSIS — I10 ESSENTIAL (PRIMARY) HYPERTENSION: ICD-10-CM

## 2024-03-15 DIAGNOSIS — Z98.891 HISTORY OF UTERINE SCAR FROM PREVIOUS SURGERY: Chronic | ICD-10-CM

## 2024-03-15 DIAGNOSIS — Z98.890 OTHER SPECIFIED POSTPROCEDURAL STATES: Chronic | ICD-10-CM

## 2024-03-15 DIAGNOSIS — M17.11 UNILATERAL PRIMARY OSTEOARTHRITIS, RIGHT KNEE: ICD-10-CM

## 2024-03-15 DIAGNOSIS — Z90.3 ACQUIRED ABSENCE OF STOMACH [PART OF]: Chronic | ICD-10-CM

## 2024-03-15 LAB
A1C WITH ESTIMATED AVERAGE GLUCOSE RESULT: 6.7 % — HIGH (ref 4–5.6)
ANION GAP SERPL CALC-SCNC: 12 MMOL/L — SIGNIFICANT CHANGE UP (ref 5–17)
APTT BLD: 31.9 SEC — SIGNIFICANT CHANGE UP (ref 24.5–35.6)
BASOPHILS # BLD AUTO: 0.04 K/UL — SIGNIFICANT CHANGE UP (ref 0–0.2)
BASOPHILS NFR BLD AUTO: 0.4 % — SIGNIFICANT CHANGE UP (ref 0–2)
BLD GP AB SCN SERPL QL: SIGNIFICANT CHANGE UP
BUN SERPL-MCNC: 11.7 MG/DL — SIGNIFICANT CHANGE UP (ref 8–20)
CALCIUM SERPL-MCNC: 9.6 MG/DL — SIGNIFICANT CHANGE UP (ref 8.4–10.5)
CHLORIDE SERPL-SCNC: 103 MMOL/L — SIGNIFICANT CHANGE UP (ref 96–108)
CO2 SERPL-SCNC: 26 MMOL/L — SIGNIFICANT CHANGE UP (ref 22–29)
CREAT SERPL-MCNC: 0.77 MG/DL — SIGNIFICANT CHANGE UP (ref 0.5–1.3)
EGFR: 81 ML/MIN/1.73M2 — SIGNIFICANT CHANGE UP
EOSINOPHIL # BLD AUTO: 0.06 K/UL — SIGNIFICANT CHANGE UP (ref 0–0.5)
EOSINOPHIL NFR BLD AUTO: 0.6 % — SIGNIFICANT CHANGE UP (ref 0–6)
ESTIMATED AVERAGE GLUCOSE: 146 MG/DL — HIGH (ref 68–114)
GLUCOSE SERPL-MCNC: 188 MG/DL — HIGH (ref 70–99)
HCT VFR BLD CALC: 42.1 % — SIGNIFICANT CHANGE UP (ref 34.5–45)
HGB BLD-MCNC: 14.3 G/DL — SIGNIFICANT CHANGE UP (ref 11.5–15.5)
IMM GRANULOCYTES NFR BLD AUTO: 0.4 % — SIGNIFICANT CHANGE UP (ref 0–0.9)
INR BLD: 1.17 RATIO — SIGNIFICANT CHANGE UP (ref 0.85–1.18)
LYMPHOCYTES # BLD AUTO: 1.75 K/UL — SIGNIFICANT CHANGE UP (ref 1–3.3)
LYMPHOCYTES # BLD AUTO: 17 % — SIGNIFICANT CHANGE UP (ref 13–44)
MCHC RBC-ENTMCNC: 31.2 PG — SIGNIFICANT CHANGE UP (ref 27–34)
MCHC RBC-ENTMCNC: 34 GM/DL — SIGNIFICANT CHANGE UP (ref 32–36)
MCV RBC AUTO: 91.7 FL — SIGNIFICANT CHANGE UP (ref 80–100)
MONOCYTES # BLD AUTO: 0.83 K/UL — SIGNIFICANT CHANGE UP (ref 0–0.9)
MONOCYTES NFR BLD AUTO: 8.1 % — SIGNIFICANT CHANGE UP (ref 2–14)
MRSA PCR RESULT.: SIGNIFICANT CHANGE UP
NEUTROPHILS # BLD AUTO: 7.58 K/UL — HIGH (ref 1.8–7.4)
NEUTROPHILS NFR BLD AUTO: 73.5 % — SIGNIFICANT CHANGE UP (ref 43–77)
PLATELET # BLD AUTO: 144 K/UL — LOW (ref 150–400)
POTASSIUM SERPL-MCNC: 4.7 MMOL/L — SIGNIFICANT CHANGE UP (ref 3.5–5.3)
POTASSIUM SERPL-SCNC: 4.7 MMOL/L — SIGNIFICANT CHANGE UP (ref 3.5–5.3)
PROTHROM AB SERPL-ACNC: 12.9 SEC — SIGNIFICANT CHANGE UP (ref 9.5–13)
RBC # BLD: 4.59 M/UL — SIGNIFICANT CHANGE UP (ref 3.8–5.2)
RBC # FLD: 15.2 % — HIGH (ref 10.3–14.5)
S AUREUS DNA NOSE QL NAA+PROBE: SIGNIFICANT CHANGE UP
SODIUM SERPL-SCNC: 141 MMOL/L — SIGNIFICANT CHANGE UP (ref 135–145)
WBC # BLD: 10.3 K/UL — SIGNIFICANT CHANGE UP (ref 3.8–10.5)
WBC # FLD AUTO: 10.3 K/UL — SIGNIFICANT CHANGE UP (ref 3.8–10.5)

## 2024-03-15 PROCEDURE — 93005 ELECTROCARDIOGRAM TRACING: CPT

## 2024-03-15 PROCEDURE — G0463: CPT

## 2024-03-15 PROCEDURE — 93010 ELECTROCARDIOGRAM REPORT: CPT

## 2024-03-15 RX ORDER — FUROSEMIDE 40 MG
1 TABLET ORAL
Refills: 0 | DISCHARGE

## 2024-03-15 RX ORDER — (INSULIN DEGLUDEC AND LIRAGLUTIDE) 100; 3.6 [IU]/ML; MG/ML
0 INJECTION, SOLUTION SUBCUTANEOUS
Refills: 0 | DISCHARGE

## 2024-03-15 RX ORDER — EMPAGLIFLOZIN 10 MG/1
1 TABLET, FILM COATED ORAL
Refills: 0 | DISCHARGE

## 2024-03-15 RX ORDER — GLIMEPIRIDE 1 MG
1 TABLET ORAL
Refills: 0 | DISCHARGE

## 2024-03-15 NOTE — H&P PST ADULT - HISTORY OF PRESENT ILLNESS
Pt is a 73-year-old female, seen today pre-op for Right knee total joint replacement for unilateral primary OA right knee. Pt reports right knee pain localized in the medial aspect of the knee, reports  "bone-on-bone"  medial compartmental osteoarthritis of the right knee. The pt was initially scheduled for a right TKA in August but was postponed for elevated hemoglobin A1c. She reports a month ago her A1c was 7.4 . Pt prior conservative treatment includes physical therapy  numerous cortisone injections without any significant improvement. Pt currently takes Tylenol and NSAIDs for pain with mild relief,   Pt is a 73-year-old female, seen today pre-op for Right knee total joint replacement for unilateral primary OA right knee. Pt reports right knee pain localized in the medial aspect of the knee, reports  "bone-on-bone"  medial compartmental osteoarthritis of the right knee. The pt was initially scheduled for a right TKA in August 2023 but was postponed for elevated hemoglobin A1c. Pt prior conservative treatment includes physical therapy  numerous cortisone injections without any significant improvement. Pt currently takes Tylenol and NSAIDs for pain with mild relief,  Pt s/p gastric sleeve 1/29/24 [facility]     Pt aggravated with lying down on the right, climbing up and down the stairs  DMT2(No current Pt is a 73-year-old female, seen today pre-op for Right knee total joint replacement for unilateral primary OA right  knee. Pt reports longstanding bilateral knee pain, right knee pain worse than the left knee. Denies any trauma and fall, states "bone-on-bone" reports right knee pain is localized to the medial aspect of the knee, pain is aggravated with weight bearing activity, walking, climbing up and down the stairs and lying on her right side. Pt prior conservative treatment includes physical therapy and cortisone injections without any significant improvement. Pt currently takes Tylenol and NSAIDs for pain with mild relief,  Pt was initially scheduled for this surgery in August 2023 but was rescheduled  for elevated hemoglobin A1c. Other medical hx includes morbidly obese BMI 45.1, DM(Current not on any medication s/p gastric sleeve 1/29/24 [facility]), CABRERA unable to tolerate CPAP machine, HTN, HLD, GERD, Chronic constipation. Past Hx MI.  Pt scheduled for this surgery on 4/4/24 with Dr. Kerns

## 2024-03-15 NOTE — H&P PST ADULT - NSICDXFAMILYHX_GEN_ALL_CORE_FT
FAMILY HISTORY:  Mother  Still living? No  FH: colon cancer, Age at diagnosis: Age Unknown    Sibling  Still living? Yes, Estimated age: Age Unknown  FHx: esophageal cancer, Age at diagnosis: Age Unknown

## 2024-03-15 NOTE — H&P PST ADULT - EKG AND INTERPRETATION
Normal sinus rhythm 69 bpm, possible left atrial enlargement, no acute change. EKG pending official reading

## 2024-03-15 NOTE — H&P PST ADULT - NSICDXPASTMEDICALHX_GEN_ALL_CORE_FT
PAST MEDICAL HISTORY:  Diabetes     High blood cholesterol level     HTN (hypertension)     Myocardial infarct     Osteoarthritis     Unilateral primary osteoarthritis, right knee      PAST MEDICAL HISTORY:  Bilateral knee pain     Diabetes     High blood cholesterol level     HTN (hypertension)     Morbidly obese     Myocardial infarct     Osteoarthritis     Unilateral primary osteoarthritis, right knee

## 2024-03-15 NOTE — H&P PST ADULT - ASSESSMENT
Pt is a 73-year-old female, seen today pre-op for Right knee total joint replacement for unilateral primary OA right  knee. Pt reports longstanding bilateral knee pain, right knee pain worse than the left knee. Denies any trauma and fall, states "bone-on-bone" reports right knee pain is localized to the medial aspect of the knee, pain is aggravated with weight bearing activity, walking, climbing up and down the stairs and lying on her right side. Pt prior conservative treatment includes physical therapy and cortisone injections without any significant improvement. Pt currently takes Tylenol and NSAIDs for pain with mild relief,  Pt was initially scheduled for this surgery in August 2023 but was rescheduled  for elevated hemoglobin A1c. Other medical hx includes morbidly obese BMI 45.1, DM(Current not on any medication s/p gastric sleeve 1/29/24 [facility]), CABRERA unable to tolerate CPAP machine, HTN, HLD, GERD, Chronic constipation. Past Hx MI.  Pt scheduled for this surgery on 4/4/24 with Dr. Kerns  Pt is a 73-year-old female, seen today pre-op for Right knee total joint replacement for unilateral primary OA right  knee. Pt reports longstanding bilateral knee pain, right knee pain worse than the left knee. Denies any trauma and fall, states "bone-on-bone" reports right knee pain is localized to the medial aspect of the knee, pain is aggravated with weight bearing activity, walking, climbing up and down the stairs and lying on her right side. Pt prior conservative treatment includes physical therapy and cortisone injections without any significant improvement. Pt currently takes Tylenol and NSAIDs for pain with mild relief,  Pt was initially scheduled for this surgery in 2023 but was rescheduled  for elevated hemoglobin A1c. Other medical hx includes morbidly obese BMI 45.1, DM(Current not on any medication s/p gastric sleeve 24 [facility]), CABRERA unable to tolerate CPAP machine, HTN, HLD, GERD, Chronic constipation. Past Hx MI.  Pt scheduled for this surgery on 24 with Dr. Kerns. Surgery protocol reviewed with Pt today, Pt to follow-up with PCP and cardiologist for evaluation and clearance and Cardiologist regarding recommendation for ASA prior to this surgery   CAPRINI VTE 2.0 SCORE [CLOT updated 2019]    AGE RELATED RISK FACTORS                                                       MOBILITY RELATED FACTORS  [ ] Age 41-60 years                                            (1 Point)                    [ ] Bed rest                                                        (1 Point)  [ ] Age: 61-74 years                                           (2 Points)                  [ ] Plaster cast                                                   (2 Points)  [ x] Age= 75 years                                              (3 Points)                    [ ] Bed bound for more than 72 hours                 (2 Points)    DISEASE RELATED RISK FACTORS                                               GENDER SPECIFIC FACTORS  [x ] Edema in the lower extremities                       (1 Point)              [ ] Pregnancy                                                     (1 Point)  [ ] Varicose veins                                               (1 Point)                     [ ] Post-partum < 6 weeks                                   (1 Point)             [ x] BMI > 25 Kg/m2                                            (1 Point)                     [ ] Hormonal therapy  or oral contraception          (1 Point)                 [ ] Sepsis (in the previous month)                        (1 Point)               [ ] History of pregnancy complications                 (1 point)  [ ] Pneumonia or serious lung disease                                               [ ] Unexplained or recurrent                     (1 Point)           (in the previous month)                               (1 Point)  [ ] Abnormal pulmonary function test                     (1 Point)                 SURGERY RELATED RISK FACTORS  [ ] Acute myocardial infarction                              (1 Point)               [ ]  Section                                             (1 Point)  [ ] Congestive heart failure (in the previous month)  (1 Point)      [ ] Minor surgery                                                  (1 Point)   [ ] Inflammatory bowel disease                             (1 Point)               [ ] Arthroscopic surgery                                        (2 Points)  [ ] Central venous access                                      (2 Points)                [ ] General surgery lasting more than 45 minutes (2 points)  [ ] Malignancy- Present or previous                   (2 Points)                [x ] Elective arthroplasty                                         (5 points)    [ ] Stroke (in the previous month)                          (5 Points)                                                                                                                                                           HEMATOLOGY RELATED FACTORS                                                 TRAUMA RELATED RISK FACTORS  [ ] Prior episodes of VTE                                     (3 Points)                [ ] Fracture of the hip, pelvis, or leg                       (5 Points)  [ ] Positive family history for VTE                         (3 Points)             [ ] Acute spinal cord injury (in the previous month)  (5 Points)  [ ] Prothrombin 18822 A                                     (3 Points)               [ ] Paralysis  (less than 1 month)                             (5 Points)  [ ] Factor V Leiden                                             (3 Points)                  [ ] Multiple Trauma within 1 month                        (5 Points)  [ ] Lupus anticoagulants                                     (3 Points)                                                           [ ] Anticardiolipin antibodies                               (3 Points)                                                       [ ] High homocysteine in the blood                      (3 Points)                                             [ ] Other congenital or acquired thrombophilia      (3 Points)                                                [ ] Heparin induced thrombocytopenia                  (3 Points)                                     Total Score [    10      ]    OPIOID RISK TOOL    AIMEE EACH BOX THAT APPLIES AND ADD TOTALS AT THE END    FAMILY HISTORY OF SUBSTANCE ABUSE                 FEMALE         MALE                                                Alcohol                             [  ]1 pt          [  ]3pts                                               Illegal Durgs                     [  ]2 pts        [  ]3pts                                               Rx Drugs                           [  ]4 pts        [  ]4 pts    PERSONAL HISTORY OF SUBSTANCE ABUSE                                                                                          Alcohol                             [  ]3 pts       [  ]3 pts                                               Illegal Drugs                     [  ]4 pts        [  ]4 pts                                               Rx Drugs                           [  ]5 pts        [  ]5 pts    AGE BETWEEN 16-45 YEARS                                      [  ]1 pt         [  ]1 pt    HISTORY OF PREADOLESCENT   SEXUAL ABUSE                                                             [  ]3 pts        [  ]0pts    PSYCHOLOGICAL DISEASE                     ADD, OCD, Bipolar, Schizophrenia        [  ]2 pts         [  ]2 pts                      Depression                                               [  ]1 pt           [  ]1 pt           SCORING TOTAL   (add numbers and type here)              (**0*)                                     A score of 3 or lower indicated LOW risk for future opioid abuse  A score of 4 to 7 indicated moderate risk for future opioid abuse  A score of 8 or higher indicates a high risk for opioid abuse

## 2024-03-15 NOTE — H&P PST ADULT - NSICDXPASTSURGICALHX_GEN_ALL_CORE_FT
PAST SURGICAL HISTORY:  H/O foot surgery     History of      History of surgery on arm     S/P gastric sleeve procedure

## 2024-03-15 NOTE — H&P PST ADULT - MUSCULOSKELETAL
details… right knee/decreased ROM/decreased ROM due to pain/joint swelling/decreased strength/abnormal gait

## 2024-03-27 NOTE — PROVIDER CONTACT NOTE (OTHER) - SITUATION
Attended joint education session on 3/27/2024 via online method with opportunity to ask questions. Contact information for follow up questions given.

## 2024-04-02 RX ORDER — POVIDONE-IODINE 5 %
1 AEROSOL (ML) TOPICAL ONCE
Refills: 0 | Status: COMPLETED | OUTPATIENT
Start: 2024-04-04 | End: 2024-04-04

## 2024-04-03 ENCOUNTER — TRANSCRIPTION ENCOUNTER (OUTPATIENT)
Age: 74
End: 2024-04-03

## 2024-04-03 NOTE — PATIENT PROFILE ADULT - FALL HARM RISK - HARM RISK INTERVENTIONS
Assistance with ambulation/Assistance OOB with selected safe patient handling equipment/Communicate Risk of Fall with Harm to all staff/Discuss with provider need for PT consult/Monitor gait and stability/Provide patient with walking aids - walker, cane, crutches/Reinforce activity limits and safety measures with patient and family/Sit up slowly, dangle for a short time, stand at bedside before walking/Tailored Fall Risk Interventions/Use of alarms - bed, chair and/or voice tab/Visual Cue: Yellow wristband and red socks/Bed in lowest position, wheels locked, appropriate side rails in place/Call bell, personal items and telephone in reach/Instruct patient to call for assistance before getting out of bed or chair/Non-slip footwear when patient is out of bed/[place] to call system/Physically safe environment - no spills, clutter or unnecessary equipment/Purposeful Proactive Rounding/Room/bathroom lighting operational, light cord in reach

## 2024-04-03 NOTE — PATIENT PROFILE ADULT - DEAF OR HARD OF HEARING?
CONSTITUTIONAL: +weakness  RESPIRATORY: No cough, wheezing, hemoptysis; No shortness of breath  CARDIOVASCULAR: No chest pain or palpitations  GASTROINTESTINAL: +LLQ cramping  GENITOURINARY: +vaginal bleeding no

## 2024-04-04 ENCOUNTER — INPATIENT (INPATIENT)
Facility: HOSPITAL | Age: 74
LOS: 0 days | Discharge: HOME | DRG: 470 | End: 2024-04-05
Attending: ORTHOPAEDIC SURGERY | Admitting: ORTHOPAEDIC SURGERY
Payer: COMMERCIAL

## 2024-04-04 ENCOUNTER — APPOINTMENT (OUTPATIENT)
Dept: ORTHOPEDIC SURGERY | Facility: HOSPITAL | Age: 74
End: 2024-04-04

## 2024-04-04 ENCOUNTER — TRANSCRIPTION ENCOUNTER (OUTPATIENT)
Age: 74
End: 2024-04-04

## 2024-04-04 VITALS
DIASTOLIC BLOOD PRESSURE: 79 MMHG | SYSTOLIC BLOOD PRESSURE: 142 MMHG | RESPIRATION RATE: 16 BRPM | WEIGHT: 275.58 LBS | TEMPERATURE: 97 F | HEART RATE: 78 BPM | HEIGHT: 69 IN

## 2024-04-04 DIAGNOSIS — Z98.890 OTHER SPECIFIED POSTPROCEDURAL STATES: Chronic | ICD-10-CM

## 2024-04-04 DIAGNOSIS — Z98.891 HISTORY OF UTERINE SCAR FROM PREVIOUS SURGERY: Chronic | ICD-10-CM

## 2024-04-04 DIAGNOSIS — M17.11 UNILATERAL PRIMARY OSTEOARTHRITIS, RIGHT KNEE: ICD-10-CM

## 2024-04-04 DIAGNOSIS — Z90.3 ACQUIRED ABSENCE OF STOMACH [PART OF]: Chronic | ICD-10-CM

## 2024-04-04 LAB
GLUCOSE BLDC GLUCOMTR-MCNC: 124 MG/DL — HIGH (ref 70–99)
GLUCOSE BLDC GLUCOMTR-MCNC: 143 MG/DL — HIGH (ref 70–99)
GLUCOSE BLDC GLUCOMTR-MCNC: 151 MG/DL — HIGH (ref 70–99)
GLUCOSE BLDC GLUCOMTR-MCNC: 227 MG/DL — HIGH (ref 70–99)
GLUCOSE BLDC GLUCOMTR-MCNC: 273 MG/DL — HIGH (ref 70–99)

## 2024-04-04 PROCEDURE — 27447 TOTAL KNEE ARTHROPLASTY: CPT | Mod: RT

## 2024-04-04 PROCEDURE — 20985 CPTR-ASST DIR MS PX: CPT

## 2024-04-04 PROCEDURE — 73560 X-RAY EXAM OF KNEE 1 OR 2: CPT | Mod: 26,RT

## 2024-04-04 PROCEDURE — 99222 1ST HOSP IP/OBS MODERATE 55: CPT

## 2024-04-04 PROCEDURE — 27447 TOTAL KNEE ARTHROPLASTY: CPT | Mod: AS,RT

## 2024-04-04 DEVICE — CEMENT PALACOS R: Type: IMPLANTABLE DEVICE | Site: RIGHT | Status: FUNCTIONAL

## 2024-04-04 DEVICE — GUIDE PIN SCREW ORTHO THRD SQ HEADED: Type: IMPLANTABLE DEVICE | Site: RIGHT | Status: FUNCTIONAL

## 2024-04-04 DEVICE — GUIDE PIN SCREW ORTHO STR FLUTED: Type: IMPLANTABLE DEVICE | Site: RIGHT | Status: FUNCTIONAL

## 2024-04-04 DEVICE — INSERT TIB EMPOWR 3D SZ 7 10MM RT: Type: IMPLANTABLE DEVICE | Site: RIGHT | Status: FUNCTIONAL

## 2024-04-04 DEVICE — STEM MOD UNIV TIB 12X40MM: Type: IMPLANTABLE DEVICE | Site: RIGHT | Status: FUNCTIONAL

## 2024-04-04 DEVICE — GUIDE PIN/SCREW ORTHO 3.2 X 37MM: Type: IMPLANTABLE DEVICE | Site: RIGHT | Status: FUNCTIONAL

## 2024-04-04 DEVICE — COMP FEM NON POROUS SZ 7 RT: Type: IMPLANTABLE DEVICE | Site: RIGHT | Status: FUNCTIONAL

## 2024-04-04 DEVICE — PIN ARVIS ALIGNMENT AR SURGICAL GUIDANCE: Type: IMPLANTABLE DEVICE | Site: RIGHT | Status: FUNCTIONAL

## 2024-04-04 DEVICE — INSERT TIB NONPOROUS UNIV SZ 7 RT: Type: IMPLANTABLE DEVICE | Site: RIGHT | Status: FUNCTIONAL

## 2024-04-04 DEVICE — COMP PATELLA TRI-PEG E-PLUS POLY 9X35MM: Type: IMPLANTABLE DEVICE | Site: RIGHT | Status: FUNCTIONAL

## 2024-04-04 RX ORDER — KETOROLAC TROMETHAMINE 30 MG/ML
15 SYRINGE (ML) INJECTION EVERY 6 HOURS
Refills: 0 | Status: DISCONTINUED | OUTPATIENT
Start: 2024-04-04 | End: 2024-04-05

## 2024-04-04 RX ORDER — PANTOPRAZOLE SODIUM 20 MG/1
1 TABLET, DELAYED RELEASE ORAL
Refills: 0 | DISCHARGE

## 2024-04-04 RX ORDER — ACETAMINOPHEN 500 MG
975 TABLET ORAL ONCE
Refills: 0 | Status: COMPLETED | OUTPATIENT
Start: 2024-04-04 | End: 2024-04-04

## 2024-04-04 RX ORDER — SENNA PLUS 8.6 MG/1
2 TABLET ORAL AT BEDTIME
Refills: 0 | Status: DISCONTINUED | OUTPATIENT
Start: 2024-04-04 | End: 2024-04-05

## 2024-04-04 RX ORDER — SODIUM CHLORIDE 9 MG/ML
1000 INJECTION, SOLUTION INTRAVENOUS
Refills: 0 | Status: DISCONTINUED | OUTPATIENT
Start: 2024-04-04 | End: 2024-04-05

## 2024-04-04 RX ORDER — ONDANSETRON 8 MG/1
4 TABLET, FILM COATED ORAL EVERY 6 HOURS
Refills: 0 | Status: DISCONTINUED | OUTPATIENT
Start: 2024-04-04 | End: 2024-04-05

## 2024-04-04 RX ORDER — INSULIN LISPRO 100/ML
VIAL (ML) SUBCUTANEOUS
Refills: 0 | Status: DISCONTINUED | OUTPATIENT
Start: 2024-04-04 | End: 2024-04-05

## 2024-04-04 RX ORDER — ROSUVASTATIN CALCIUM 5 MG/1
1 TABLET ORAL
Refills: 0 | DISCHARGE

## 2024-04-04 RX ORDER — DEXTROSE 50 % IN WATER 50 %
15 SYRINGE (ML) INTRAVENOUS ONCE
Refills: 0 | Status: DISCONTINUED | OUTPATIENT
Start: 2024-04-04 | End: 2024-04-05

## 2024-04-04 RX ORDER — ACETAMINOPHEN 500 MG
975 TABLET ORAL EVERY 8 HOURS
Refills: 0 | Status: DISCONTINUED | OUTPATIENT
Start: 2024-04-04 | End: 2024-04-05

## 2024-04-04 RX ORDER — SODIUM CHLORIDE 9 MG/ML
1000 INJECTION INTRAMUSCULAR; INTRAVENOUS; SUBCUTANEOUS
Refills: 0 | Status: DISCONTINUED | OUTPATIENT
Start: 2024-04-04 | End: 2024-04-05

## 2024-04-04 RX ORDER — ATORVASTATIN CALCIUM 80 MG/1
40 TABLET, FILM COATED ORAL AT BEDTIME
Refills: 0 | Status: DISCONTINUED | OUTPATIENT
Start: 2024-04-04 | End: 2024-04-05

## 2024-04-04 RX ORDER — SODIUM CHLORIDE 9 MG/ML
3 INJECTION INTRAMUSCULAR; INTRAVENOUS; SUBCUTANEOUS EVERY 8 HOURS
Refills: 0 | Status: DISCONTINUED | OUTPATIENT
Start: 2024-04-04 | End: 2024-04-04

## 2024-04-04 RX ORDER — HYDROMORPHONE HYDROCHLORIDE 2 MG/ML
4 INJECTION INTRAMUSCULAR; INTRAVENOUS; SUBCUTANEOUS EVERY 4 HOURS
Refills: 0 | Status: DISCONTINUED | OUTPATIENT
Start: 2024-04-04 | End: 2024-04-05

## 2024-04-04 RX ORDER — TRANEXAMIC ACID 100 MG/ML
1000 INJECTION, SOLUTION INTRAVENOUS ONCE
Refills: 0 | Status: DISCONTINUED | OUTPATIENT
Start: 2024-04-04 | End: 2024-04-04

## 2024-04-04 RX ORDER — POLYETHYLENE GLYCOL 3350 17 G/17G
17 POWDER, FOR SOLUTION ORAL AT BEDTIME
Refills: 0 | Status: DISCONTINUED | OUTPATIENT
Start: 2024-04-04 | End: 2024-04-05

## 2024-04-04 RX ORDER — DOCUSATE SODIUM 100 MG
1 CAPSULE ORAL
Refills: 0 | DISCHARGE

## 2024-04-04 RX ORDER — ACETAMINOPHEN 500 MG
1000 TABLET ORAL ONCE
Refills: 0 | Status: COMPLETED | OUTPATIENT
Start: 2024-04-04 | End: 2024-04-05

## 2024-04-04 RX ORDER — LOSARTAN POTASSIUM 100 MG/1
50 TABLET, FILM COATED ORAL DAILY
Refills: 0 | Status: DISCONTINUED | OUTPATIENT
Start: 2024-04-06 | End: 2024-04-05

## 2024-04-04 RX ORDER — CEFAZOLIN SODIUM 1 G
2000 VIAL (EA) INJECTION
Refills: 0 | Status: COMPLETED | OUTPATIENT
Start: 2024-04-04 | End: 2024-04-05

## 2024-04-04 RX ORDER — POTASSIUM CHLORIDE 20 MEQ
1 PACKET (EA) ORAL
Refills: 0 | DISCHARGE

## 2024-04-04 RX ORDER — DEXTROSE 10 % IN WATER 10 %
125 INTRAVENOUS SOLUTION INTRAVENOUS ONCE
Refills: 0 | Status: DISCONTINUED | OUTPATIENT
Start: 2024-04-04 | End: 2024-04-05

## 2024-04-04 RX ORDER — OXYCODONE HYDROCHLORIDE 5 MG/1
5 TABLET ORAL
Refills: 0 | Status: DISCONTINUED | OUTPATIENT
Start: 2024-04-04 | End: 2024-04-05

## 2024-04-04 RX ORDER — MAGNESIUM HYDROXIDE 400 MG/1
30 TABLET, CHEWABLE ORAL DAILY
Refills: 0 | Status: DISCONTINUED | OUTPATIENT
Start: 2024-04-04 | End: 2024-04-05

## 2024-04-04 RX ORDER — DEXTROSE 50 % IN WATER 50 %
25 SYRINGE (ML) INTRAVENOUS ONCE
Refills: 0 | Status: DISCONTINUED | OUTPATIENT
Start: 2024-04-04 | End: 2024-04-05

## 2024-04-04 RX ORDER — PANTOPRAZOLE SODIUM 20 MG/1
40 TABLET, DELAYED RELEASE ORAL
Refills: 0 | Status: DISCONTINUED | OUTPATIENT
Start: 2024-04-04 | End: 2024-04-05

## 2024-04-04 RX ORDER — CEFAZOLIN SODIUM 1 G
3000 VIAL (EA) INJECTION ONCE
Refills: 0 | Status: DISCONTINUED | OUTPATIENT
Start: 2024-04-04 | End: 2024-04-04

## 2024-04-04 RX ORDER — CELECOXIB 200 MG/1
200 CAPSULE ORAL EVERY 12 HOURS
Refills: 0 | Status: DISCONTINUED | OUTPATIENT
Start: 2024-04-06 | End: 2024-04-05

## 2024-04-04 RX ORDER — GLUCAGON INJECTION, SOLUTION 0.5 MG/.1ML
1 INJECTION, SOLUTION SUBCUTANEOUS ONCE
Refills: 0 | Status: DISCONTINUED | OUTPATIENT
Start: 2024-04-04 | End: 2024-04-05

## 2024-04-04 RX ORDER — LOSARTAN POTASSIUM 100 MG/1
0.5 TABLET, FILM COATED ORAL
Refills: 0 | DISCHARGE

## 2024-04-04 RX ORDER — METOPROLOL TARTRATE 50 MG
0.5 TABLET ORAL
Refills: 0 | DISCHARGE

## 2024-04-04 RX ORDER — SODIUM CHLORIDE 9 MG/ML
1000 INJECTION, SOLUTION INTRAVENOUS
Refills: 0 | Status: DISCONTINUED | OUTPATIENT
Start: 2024-04-04 | End: 2024-04-04

## 2024-04-04 RX ORDER — ASPIRIN/CALCIUM CARB/MAGNESIUM 324 MG
81 TABLET ORAL
Refills: 0 | Status: DISCONTINUED | OUTPATIENT
Start: 2024-04-04 | End: 2024-04-05

## 2024-04-04 RX ORDER — OXYCODONE HYDROCHLORIDE 5 MG/1
10 TABLET ORAL
Refills: 0 | Status: DISCONTINUED | OUTPATIENT
Start: 2024-04-04 | End: 2024-04-05

## 2024-04-04 RX ORDER — FENTANYL CITRATE 50 UG/ML
50 INJECTION INTRAVENOUS
Refills: 0 | Status: DISCONTINUED | OUTPATIENT
Start: 2024-04-04 | End: 2024-04-04

## 2024-04-04 RX ORDER — DEXTROSE 50 % IN WATER 50 %
12.5 SYRINGE (ML) INTRAVENOUS ONCE
Refills: 0 | Status: DISCONTINUED | OUTPATIENT
Start: 2024-04-04 | End: 2024-04-05

## 2024-04-04 RX ORDER — HYDROMORPHONE HYDROCHLORIDE 2 MG/ML
0.5 INJECTION INTRAMUSCULAR; INTRAVENOUS; SUBCUTANEOUS
Refills: 0 | Status: DISCONTINUED | OUTPATIENT
Start: 2024-04-04 | End: 2024-04-04

## 2024-04-04 RX ORDER — ONDANSETRON 8 MG/1
4 TABLET, FILM COATED ORAL ONCE
Refills: 0 | Status: DISCONTINUED | OUTPATIENT
Start: 2024-04-04 | End: 2024-04-04

## 2024-04-04 RX ORDER — APREPITANT 80 MG/1
40 CAPSULE ORAL ONCE
Refills: 0 | Status: COMPLETED | OUTPATIENT
Start: 2024-04-04 | End: 2024-04-04

## 2024-04-04 RX ADMIN — Medication 15 MILLIGRAM(S): at 18:30

## 2024-04-04 RX ADMIN — Medication 975 MILLIGRAM(S): at 08:41

## 2024-04-04 RX ADMIN — Medication 4: at 21:45

## 2024-04-04 RX ADMIN — SODIUM CHLORIDE 100 MILLILITER(S): 9 INJECTION INTRAMUSCULAR; INTRAVENOUS; SUBCUTANEOUS at 17:58

## 2024-04-04 RX ADMIN — Medication 1 APPLICATION(S): at 08:57

## 2024-04-04 RX ADMIN — APREPITANT 40 MILLIGRAM(S): 80 CAPSULE ORAL at 08:41

## 2024-04-04 RX ADMIN — Medication 2000 MILLIGRAM(S): at 17:56

## 2024-04-04 RX ADMIN — Medication 6: at 18:07

## 2024-04-04 RX ADMIN — SENNA PLUS 2 TABLET(S): 8.6 TABLET ORAL at 21:40

## 2024-04-04 RX ADMIN — Medication 975 MILLIGRAM(S): at 21:41

## 2024-04-04 RX ADMIN — Medication 15 MILLIGRAM(S): at 23:47

## 2024-04-04 RX ADMIN — Medication 15 MILLIGRAM(S): at 17:57

## 2024-04-04 RX ADMIN — Medication 81 MILLIGRAM(S): at 17:56

## 2024-04-04 RX ADMIN — ATORVASTATIN CALCIUM 40 MILLIGRAM(S): 80 TABLET, FILM COATED ORAL at 21:40

## 2024-04-04 RX ADMIN — OXYCODONE HYDROCHLORIDE 5 MILLIGRAM(S): 5 TABLET ORAL at 18:30

## 2024-04-04 RX ADMIN — Medication 975 MILLIGRAM(S): at 21:40

## 2024-04-04 RX ADMIN — ONDANSETRON 4 MILLIGRAM(S): 8 TABLET, FILM COATED ORAL at 17:57

## 2024-04-04 RX ADMIN — OXYCODONE HYDROCHLORIDE 5 MILLIGRAM(S): 5 TABLET ORAL at 17:57

## 2024-04-04 NOTE — PHYSICAL THERAPY INITIAL EVALUATION ADULT - NEUROVASCULAR ASSESSMENT RLE
however, pt does report diminished sensation on the right foot compared to left./warm/no numbness/no tingling/no discoloration

## 2024-04-04 NOTE — ASU PREOP CHECKLIST - IV STARTED
78 year old male, pmhx as documented above, presenting with worsening dementia. Patient unable to provide further history but per triage note and wife, patient has been more agitated than usual and family does not feel comfortable caring for him at home. No known fevers or other complaints.    Vital Signs: I have reviewed the initial vital signs.  Constitutional: NAD, well-nourished, appears stated age, no acute distress.  HEENT: Airway patent, moist MM, no erythema/swelling/deformity of oral structures. EOMI, PERRLA.  CV: regular rate, regular rhythm, well-perfused extremities, 2+ b/l DP and radial pulses equal.  Lungs: BCTA, no increased WOB.  ABD: NTND, no guarding or rebound, no pulsatile mass, no hernias.   MSK: Neck supple, nontender, nl ROM, no stepoff. Chest nontender. Back nontender in TLS spine or to b/l bony structures or flanks. Ext nontender, nl rom, no deformity.   INTEG: Skin warm, dry, no rash.  NEURO: Moving all extremities but not following commands  PSYCH: Unable to assess    Will obtain labs, CT head, UA for medical clearance, but will likely require admission given social circumstances. yes

## 2024-04-04 NOTE — PHYSICAL THERAPY INITIAL EVALUATION ADULT - RANGE OF MOTION EXAMINATION, REHAB EVAL
RLE: knee flexion to approximately 80 degrees, knee extension WFL, hip and ankle ROM WFL/bilateral upper extremity ROM was WFL (within functional limits)/Left LE ROM was WFL (within functional limits)

## 2024-04-04 NOTE — DISCHARGE NOTE PROVIDER - CARE PROVIDER_API CALL
Jhonathan Kerns  Orthopaedic Surgery  [address] [place] [id number]  Phone: [phone]  Fax: [phone]  Follow Up Time:

## 2024-04-04 NOTE — PHYSICAL THERAPY INITIAL EVALUATION ADULT - ADDITIONAL COMMENTS
Pt reports she lives with her daughter in a private home with 12 GIO with 2 Handrails. The house has 0 interior stairs.  Pt reports she is alone during the day time.  DME: pt owns a cane, RW, and has grab bars in her shower. pt reports she is planning to have a shower seat installed in her shower.

## 2024-04-04 NOTE — DISCHARGE NOTE PROVIDER - NSDCMRMEDTOKEN_GEN_ALL_CORE_FT
aspirin 81 mg oral tablet: 1 orally once a day  Colace 50 mg oral capsule: 1 cap(s) orally  Cozaar 100 mg oral tablet: 0.5 tablet orally once a day  Potassium Chloride (Eqv-Klor-Con 10) 10 mEq oral tablet, extended release: 1 orally every other day  rosuvastatin 10 mg oral capsule: 1 orally once a day (at bedtime)   acetaminophen 325 mg oral tablet: 3 tab(s) orally every 8 hours  aspirin 81 mg oral delayed release tablet: 1 tab(s) orally 2 times a day x 6 weeks for DVT ppx  celecoxib 200 mg oral capsule: 1 cap(s) orally every 12 hours Start 4/6/24.  Colace 50 mg oral capsule: 1 cap(s) orally  Cozaar 100 mg oral tablet: 0.5 tablet orally once a day  naloxone 4 mg/0.1 mL nasal spray: 1 spray(s) intranasally once a day as needed for  narcotic overdose  oxyCODONE 5 mg oral tablet: 1 tab(s) orally every 6 hours as needed for  pain MDD: 4  pantoprazole 40 mg oral delayed release tablet: 1 tab(s) orally once a day (before a meal)  Potassium Chloride (Eqv-Klor-Con 10) 10 mEq oral tablet, extended release: 1 orally every other day  rosuvastatin 10 mg oral capsule: 1 orally once a day (at bedtime)  senna leaf extract oral tablet: 2 tab(s) orally once a day (at bedtime)

## 2024-04-04 NOTE — PHYSICAL THERAPY INITIAL EVALUATION ADULT - LIGHT TOUCH SENSATION, RLE, REHAB EVAL
intact to light touch, however pt reports diminished sensation when compared with LLE/within normal limits

## 2024-04-04 NOTE — CONSULT NOTE ADULT - SUBJECTIVE AND OBJECTIVE BOX
CC: Right knee pain    HPI:  74-year-old female with PMH morbidly obese, DM (Current not on any medication s/p gastric sleeve 24 [facility]), CABRERA unable to tolerate CPAP, HTN, HLD, GERD, Chronic constipation, MI presents with longstanding bilateral knee pain, right knee pain worse than the left knee. Denied any trauma and fall, stated "bone-on-bone." Patient reported right knee pain was localized to the medial aspect of the knee, pain was aggravated with weight bearing activity, walking, climbing up and down the stairs and lying on her right side. Patient reported prior conservative treatment included physical therapy and cortisone injections without any significant improvement. Patient was taking Tylenol and NSAIDs for pain with mild relief.  Pt was initially scheduled for this surgery in 2023 but was rescheduled  for elevated hemoglobin A1c. Patient now s/p right TKA POD #0.  Patient seen and examined in PACU.  Pain controlled.      PAST MEDICAL & SURGICAL HISTORY:  Diabetes  HTN (hypertension)  High cholesterol   Osteoarthritis  Myocardial infarct  Unilateral primary osteoarthritis, right knee  Morbidly obese  History of surgery on arm  H/O foot surgery  S/P gastric sleeve procedure  History of     FAMILY HISTORY:  FH: colon cancer (Mother)  FHx: esophageal cancer (Sibling)  FH: Breast cancer (Sister x2)    SOCIAL HISTORY:  Tobacco - Quit 35years ago  ETOH - Occasionally  Drug use - Denies    ALLERGIES:  NKDA    HOME MEDICATIONS:  aspirin 81 mg oral tablet: 1 orally once a day (2024 08:35)  Colace 50 mg oral capsule: 1 cap(s) orally (2024 08:35)  Cozaar 100 mg oral tablet: 0.5 tablet orally once a day (2024 08:35)  Potassium Chloride (Eqv-Klor-Con 10) 10 mEq oral tablet, extended release: 1 orally every other day (2024 08:35)  rosuvastatin 10 mg oral capsule: 1 orally once a day (at bedtime) (2024 08:35)    MEDICATIONS  (STANDING):  aspirin enteric coated 81 milliGRAM(s) Oral two times a day  ceFAZolin  Injectable. 2000 milliGRAM(s) IV Push <User Schedule>  dextrose 10% Bolus 125 milliLiter(s) IV Bolus once  dextrose 5%. 1000 milliLiter(s) (100 mL/Hr) IV Continuous <Continuous>  dextrose 5%. 1000 milliLiter(s) (50 mL/Hr) IV Continuous <Continuous>  dextrose 50% Injectable 25 Gram(s) IV Push once  dextrose 50% Injectable 12.5 Gram(s) IV Push once  glucagon  Injectable 1 milliGRAM(s) IntraMuscular once  insulin lispro (ADMELOG) corrective regimen sliding scale   SubCutaneous Before meals and at bedtime  lactated ringers. 1000 milliLiter(s) (75 mL/Hr) IV Continuous <Continuous>    MEDICATIONS  (PRN):  dextrose Oral Gel 15 Gram(s) Oral once PRN Blood Glucose LESS THAN 70 milliGRAM(s)/deciliter  fentaNYL    Injectable 50 MICROGram(s) IV Push every 5 minutes PRN Severe Pain (7 - 10)  HYDROmorphone  Injectable 0.5 milliGRAM(s) IV Push every 10 minutes PRN Moderate Pain (4 - 6)  ondansetron Injectable 4 milliGRAM(s) IV Push once PRN Nausea and/or Vomiting      REVIEW OF SYSTEMS:  CONSTITUTIONAL: No fever, weight loss, or fatigue  EYES: No eye pain, visual disturbances, or discharge  NECK: No pain or stiffness  RESPIRATORY: No cough, wheezing, or chills; No shortness of breath  CARDIOVASCULAR: No chest pain, palpitations, dizziness, or leg swelling  GASTROINTESTINAL: No abdominal or epigastric pain. No nausea, vomiting, or hematemesis; No diarrhea or constipation.  GENITOURINARY: No dysuria, frequency, hematuria, or incontinence  NEUROLOGICAL: No headaches, memory loss, loss of strength, numbness, or tremors  SKIN: No itching, burning, rashes, or lesions   MUSCULOSKELETAL: Right knee pain  PSYCHIATRIC: No depression, anxiety, mood swings, or difficulty sleeping      Vital Signs Last 24 Hrs  T(C): 36.6 (2024 13:40), Max: 36.6 (2024 13:40)  T(F): 97.8 (2024 13:40), Max: 97.8 (2024 13:40)  HR: 70 (2024 13:55) (69 - 78)  BP: 136/76 (2024 14:15) (122/69 - 151/66)  BP(mean): --  RR: 15 (2024 13:55) (15 - 18)  SpO2: 96% (2024 13:55) (96% - 100%)    Parameters below as of 2024 13:55  Patient On (Oxygen Delivery Method): nasal cannula      PHYSICAL EXAM:  GENERAL: NAD  HEAD:  Atraumatic, Normocephalic  EYES: conjunctiva and sclera clear  NECK: Supple, No JVD, Normal thyroid  NERVOUS SYSTEM:  Alert & Oriented X3, Good concentration; Motor Strength 5/5 B/L upper and lower extremities  CHEST/LUNG: Clear to auscultation bilaterally  HEART: Regular rate and rhythm; No murmurs, rubs, or gallops  ABDOMEN: Soft, Nontender, Nondistended; Bowel sounds present  EXTREMITIES:  2+ Peripheral Pulses, Right knee with clean dressing  SKIN: No rashes or lesions     CC: Right knee pain    HPI:  74-year-old female with PMH morbidly obese, DM (Current not on any medication s/p gastric sleeve 24 [facility]), CABRERA unable to tolerate CPAP, HTN, HLD, GERD, Chronic constipation, MI presents with longstanding bilateral knee pain, right knee pain worse than the left knee. Denied any trauma and fall, stated "bone-on-bone." Patient reported right knee pain was localized to the medial aspect of the knee, pain was aggravated with weight bearing activity, walking, climbing up and down the stairs and lying on her right side. Patient reported prior conservative treatment included physical therapy and cortisone injections without any significant improvement. Patient was taking Tylenol and NSAIDs for pain with mild relief.  Pt was initially scheduled for this surgery in 2023 but was rescheduled  for elevated hemoglobin A1c. Patient now s/p right TKA POD #0.  Patient seen and examined in PACU.  Pain controlled.      PAST MEDICAL & SURGICAL HISTORY:  Diabetes  HTN (hypertension)  High cholesterol   Osteoarthritis  Myocardial infarct  Unilateral primary osteoarthritis, right knee  Morbidly obese  History of surgery on arm  H/O foot surgery  S/P gastric sleeve procedure  History of     FAMILY HISTORY:  FH: colon cancer (Mother)  FHx: esophageal cancer (Sibling)  FH: Breast cancer (Sister x2)    SOCIAL HISTORY:  Tobacco - Quit 35years ago  ETOH - Occasionally  Drug use - Denies    ALLERGIES:  NKDA    HOME MEDICATIONS:  aspirin 81 mg oral tablet: 1 orally once a day (2024 08:35)  Colace 50 mg oral capsule: 1 cap(s) orally (2024 08:35)  Cozaar 100 mg oral tablet: 0.5 tablet orally once a day (2024 08:35)  Potassium Chloride (Eqv-Klor-Con 10) 10 mEq oral tablet, extended release: 1 orally every other day (2024 08:35)  rosuvastatin 10 mg oral capsule: 1 orally once a day (at bedtime) (2024 08:35)    MEDICATIONS  (STANDING):  aspirin enteric coated 81 milliGRAM(s) Oral two times a day  ceFAZolin  Injectable. 2000 milliGRAM(s) IV Push <User Schedule>  dextrose 10% Bolus 125 milliLiter(s) IV Bolus once  dextrose 5%. 1000 milliLiter(s) (100 mL/Hr) IV Continuous <Continuous>  dextrose 5%. 1000 milliLiter(s) (50 mL/Hr) IV Continuous <Continuous>  dextrose 50% Injectable 25 Gram(s) IV Push once  dextrose 50% Injectable 12.5 Gram(s) IV Push once  glucagon  Injectable 1 milliGRAM(s) IntraMuscular once  insulin lispro (ADMELOG) corrective regimen sliding scale   SubCutaneous Before meals and at bedtime  lactated ringers. 1000 milliLiter(s) (75 mL/Hr) IV Continuous <Continuous>    MEDICATIONS  (PRN):  dextrose Oral Gel 15 Gram(s) Oral once PRN Blood Glucose LESS THAN 70 milliGRAM(s)/deciliter  fentaNYL    Injectable 50 MICROGram(s) IV Push every 5 minutes PRN Severe Pain (7 - 10)  HYDROmorphone  Injectable 0.5 milliGRAM(s) IV Push every 10 minutes PRN Moderate Pain (4 - 6)  ondansetron Injectable 4 milliGRAM(s) IV Push once PRN Nausea and/or Vomiting      REVIEW OF SYSTEMS:  CONSTITUTIONAL: No fever, weight loss, or fatigue  EYES: No eye pain, visual disturbances, or discharge  NECK: No pain or stiffness  RESPIRATORY: No cough, wheezing, or chills; No shortness of breath  CARDIOVASCULAR: No chest pain, palpitations, dizziness, or leg swelling  GASTROINTESTINAL: No abdominal or epigastric pain. No nausea, vomiting, or hematemesis; No diarrhea or constipation.  GENITOURINARY: No dysuria, frequency, hematuria, or incontinence  NEUROLOGICAL: No headaches, memory loss, loss of strength, numbness, or tremors  SKIN: No itching, burning, rashes, or lesions   MUSCULOSKELETAL: Right knee pain  PSYCHIATRIC: No depression, anxiety, mood swings, or difficulty sleeping      Vital Signs Last 24 Hrs  T(C): 36.6 (2024 13:40), Max: 36.6 (2024 13:40)  T(F): 97.8 (2024 13:40), Max: 97.8 (2024 13:40)  HR: 70 (2024 13:55) (69 - 78)  BP: 136/76 (2024 14:15) (122/69 - 151/66)  BP(mean): --  RR: 15 (2024 13:55) (15 - 18)  SpO2: 96% (2024 13:55) (96% - 100%)    Parameters below as of 2024 13:55  Patient On (Oxygen Delivery Method): nasal cannula      PHYSICAL EXAM:  GENERAL: NAD  HEAD:  Atraumatic, Normocephalic  EYES: conjunctiva and sclera clear  NECK: Supple, No JVD, Normal thyroid  NERVOUS SYSTEM:  Alert & Oriented X3, Good concentration; Motor Strength 5/5 B/L upper and lower extremities  CHEST/LUNG: Clear to auscultation bilaterally  HEART: Regular rate and rhythm; No murmurs, rubs, or gallops  ABDOMEN: Soft, Nontender, Nondistended; Bowel sounds present  EXTREMITIES:  2+ Peripheral Pulses, Right knee with clean dressing  SKIN: No rashes or lesions    aspirin 81 mg oral tablet: 1 orally once a day (2024 08:35)  Colace 50 mg oral capsule: 1 cap(s) orally (2024 08:35)  Cozaar 100 mg oral tablet: 0.5 tablet orally once a day (2024 08:35)  Potassium Chloride (Eqv-Klor-Con 10) 10 mEq oral tablet, extended release: 1 orally every other day (2024 08:35)  rosuvastatin 10 mg oral capsule: 1 orally once a day (at bedtime) (2024 08:35)

## 2024-04-04 NOTE — PHYSICAL THERAPY INITIAL EVALUATION ADULT - GENERAL OBSERVATIONS, REHAB EVAL
Pt received reclining in bed, bedrails x 4, CBWR, IV, and SCDs intact. Pt agreeable to PT evaluation. Pt received reclining in bed, bedrails x 4, CBWR, IV, tele, and SCDs intact. Pt agreeable to PT evaluation.

## 2024-04-04 NOTE — CONSULT NOTE ADULT - NS ATTEND AMEND GEN_ALL_CORE FT
Patient seen and examined , s/p R TKA , POD#0  Vital Signs Last 24 Hrs  T(C): 36.6 (04 Apr 2024 13:40), Max: 36.6 (04 Apr 2024 13:40)  T(F): 97.8 (04 Apr 2024 13:40), Max: 97.8 (04 Apr 2024 13:40)  HR: 66 (04 Apr 2024 16:00) (66 - 78)  BP: 145/79 (04 Apr 2024 16:00) (122/69 - 153/75)  BP(mean): --  RR: 16 (04 Apr 2024 16:00) (15 - 19)  SpO2: 99% (04 Apr 2024 15:30) (96% - 100%)    Parameters below as of 04 Apr 2024 16:00  Patient On (Oxygen Delivery Method): room air    Obese  Lungs: CTA B/L   CVS: S1S2 , no rubs , no murmurs   R knee with dry and clean dressing +     Above noted and reviewed with NP .   Obesity - BMI - 40.7 - CLASS- 3, will benefit from weight reductions,   life style changes and outpatient options may be d/w PMD     Thank you for the courtesy of this consult ,   will follow along with you .

## 2024-04-04 NOTE — PHYSICAL THERAPY INITIAL EVALUATION ADULT - PERTINENT HX OF CURRENT PROBLEM, REHAB EVAL
Dx: pt is s/p right knee TKA  PMH: morbid obesity, DM, s/p gastric sleeve 1/29/24, CABRERA, HTN, HLD, GERD, chronic constipation, MI

## 2024-04-04 NOTE — PHYSICAL THERAPY INITIAL EVALUATION ADULT - MANUAL MUSCLE TESTING RESULTS, REHAB EVAL
RLE: ankle dorsiflexors 5/5, knee extensors at least 3/5, hip flexors at least 3/5.  LLE: ankle dorsiflexors 5/5, knee extensors 5/5, hip flexors at least 3/5

## 2024-04-04 NOTE — DISCHARGE NOTE PROVIDER - NSDCFUSCHEDAPPT_GEN_ALL_CORE_FT
[facility]  ORTHOSURG [address]  Scheduled Appointment: 04/25/2024    Sean Quintanilla  [facility]  PULMMED [address]  Scheduled Appointment: 05/22/2024    Jhonathan Kerns  [facility]  ORTHOSURG [address]  Scheduled Appointment: 06/10/2024

## 2024-04-04 NOTE — DISCHARGE NOTE PROVIDER - NSDCFUADDINST_GEN_ALL_CORE_FT
The patient will be seen in the office between 2-3 weeks for wound check.   **Your first post-operative visit has been scheduled prior to your admission. PLEASE CONTACT OFFICE TO CONFIRM THE APPOINTMENT DATE. Sutures/Staples/Tape will be removed at that time.  **  The silver based dressing is to be removed 7 days from the date of surgery.   ** CONTACT THE OFFICE IF THE FOLLOWING DEVELOP:  - the dressing becomes soiled or saturated  - you develop a fever greater that 101F  - the wound becomes red or you develop blistering around the wound  * Patient may shower after post-op day #3.   * The patient will continue home PT consistent with  total knee replacement protocol. Transition to outpatient PT will occur at the time of the first office visit.   * The patient will practice knee extension exercises regularly to minimize hamstring contraction.   * The patient is FULL weight bearing.  * The patient will continue ASPIRIN 81mg twice daily for 6 weeks after surgery for blood clot prevention.  * While on aspirin, the patient will take daily omeprazole or other similar medication to protect the stomach from irritation.   * The patient will take OXYCODONE for pain control and adjust according to prescription and patient needs. Also continue TYLENOL 975mg every 8 hours for pain. Contact the office if pain increases while taking prescribed pain medications or related concerns develop.  * Celebrex will be taken twice daily for 3 weeks for pain control and prevention of excessive bone growth. Additional prescription may be requested at your office follow-up visit.   * The patient will take Senna S while taking oxycodone to prevent narcotic associated constipation.  Additionally, increase water intake (drink at least 8 glasses of water daily) and try adding fiber to the diet by eating fruits, vegetables and foods that are rich in grains. If constipation is experienced, contact the medical/primary care provider to discuss further treatment options.  * To avoid injury at home:  - continue use of rolling walker until cleared by physical therapist  - have family or friend remove all throw rug or objects in hallways that may present a trip hazard.  - if you experience any dizziness or medical concerns, call your medical doctor or  911.  * The implant may activate metal detection devices.  The patient will be seen in the office between 2-3 weeks for wound check.   **Your first post-operative visit has been scheduled prior to your admission. PLEASE CONTACT OFFICE TO CONFIRM THE APPOINTMENT DATE. Sutures/Staples/Tape will be removed at that time.  **  The silver based dressing is to be removed 7 days from the date of surgery.   ** CONTACT THE OFFICE IF THE FOLLOWING DEVELOP:  - the dressing becomes soiled or saturated  - you develop a fever greater that 101F  - the wound becomes red or you develop blistering around the wound  * Patient may shower after post-op day #3.   * The patient will continue home PT consistent with  total knee replacement protocol. Transition to outpatient PT will occur at the time of the first office visit.   * The patient will practice knee extension exercises regularly to minimize hamstring contraction.   * The patient is FULL weight bearing.  * The patient will continue ASPIRIN 81mg twice daily for 6 weeks after surgery for blood clot prevention.  * While on aspirin, the patient will take daily omeprazole or other similar medication to protect the stomach from irritation.   * The patient will take OXYCODONE for pain control and adjust according to prescription and patient needs. Also continue TYLENOL 975mg every 8 hours for pain. Contact the office if pain increases while taking prescribed pain medications or related concerns develop.  * Celebrex will be taken twice daily for 3 weeks for pain control and prevention of excessive bone growth. Additional prescription may be requested at your office follow-up visit.   * The patient will take Senna S while taking oxycodone to prevent narcotic associated constipation.  Additionally, increase water intake (drink at least 8 glasses of water daily) and try adding fiber to the diet by eating fruits, vegetables and foods that are rich in grains. If constipation is experienced, contact the medical/primary care provider to discuss further treatment options.  * To avoid injury at home:  - continue use of rolling walker until cleared by physical therapist  - have family or friend remove all throw rug or objects in hallways that may present a trip hazard.  - if you experience any dizziness or medical concerns, call your medical doctor or  911.  * The implant may activate metal detection devices.   PLEASE FOLLOW UP WITH PCP IN 1 WEEK FOR REPEAT BLOOD WORK TO CHECK PLATLETS

## 2024-04-04 NOTE — DISCHARGE NOTE PROVIDER - HOSPITAL COURSE
The patient underwent a right TOTAL KNEE REPLACEMENT on 4/4/24. The patient received antibiotics consistent with SCIP guidelines. The patient underwent the procedure and had no intra-operative complications. Post-operatively, the patient was seen by medicine and PT. The patient received Aspirin for DVTP. The patient received pain medications per orthopedic pain management protocol and the pain was appropriately controlled. The patient did not have any post-operative medical complications. The patient was discharged in stable condition.

## 2024-04-04 NOTE — CONSULT NOTE ADULT - ASSESSMENT
74-year-old female with PMH morbidly obese, DM (Current not on any medication s/p gastric sleeve 1/29/24 [facility]), CABRERA unable to tolerate CPAP, HTN, HLD, GERD, Chronic constipation, MI presents with longstanding bilateral knee pain, right knee pain worse than the left knee. Denied any trauma and fall, stated "bone-on-bone." Patient reported right knee pain was localized to the medial aspect of the knee, pain was aggravated with weight bearing activity, walking, climbing up and down the stairs and lying on her right side. Patient reported prior conservative treatment included physical therapy and cortisone injections without any significant improvement. Patient was taking Tylenol and NSAIDs for pain with mild relief.  Pt was initially scheduled for this surgery in August 2023 but was rescheduled  for elevated hemoglobin A1c. Patient now s/p right TKA POD #0.     Right knee OA  S/p Right TKA POD #0.  Pain control.  PT/OT.  Antibiotics, wound care and DVT prophylaxis as per Ortho.  Incentive spirometry.  Avoid opioid induced constipation.    HTN  Resume Losartan on POD #2.    HLD  Continue statin.    DM2  Patient no longer on medications after gastric sleeve 1/29/24.  A1c 6.7.  Accuchecks qAC and qHS with sliding scale coverage.  ADA diet    CABRERA  Patient unable to tolerate CPAP.    DVT prophylaxis  ASA BID as per Ortho.

## 2024-04-05 ENCOUNTER — TRANSCRIPTION ENCOUNTER (OUTPATIENT)
Age: 74
End: 2024-04-05

## 2024-04-05 VITALS
SYSTOLIC BLOOD PRESSURE: 121 MMHG | HEART RATE: 61 BPM | TEMPERATURE: 98 F | RESPIRATION RATE: 18 BRPM | DIASTOLIC BLOOD PRESSURE: 74 MMHG | OXYGEN SATURATION: 93 %

## 2024-04-05 LAB
ANION GAP SERPL CALC-SCNC: 12 MMOL/L — SIGNIFICANT CHANGE UP (ref 5–17)
ANION GAP SERPL CALC-SCNC: 14 MMOL/L — SIGNIFICANT CHANGE UP (ref 5–17)
BLD GP AB SCN SERPL QL: SIGNIFICANT CHANGE UP
BUN SERPL-MCNC: 16.9 MG/DL — SIGNIFICANT CHANGE UP (ref 8–20)
BUN SERPL-MCNC: 17.1 MG/DL — SIGNIFICANT CHANGE UP (ref 8–20)
CALCIUM SERPL-MCNC: 8.7 MG/DL — SIGNIFICANT CHANGE UP (ref 8.4–10.5)
CALCIUM SERPL-MCNC: 8.9 MG/DL — SIGNIFICANT CHANGE UP (ref 8.4–10.5)
CHLORIDE SERPL-SCNC: 104 MMOL/L — SIGNIFICANT CHANGE UP (ref 96–108)
CHLORIDE SERPL-SCNC: 106 MMOL/L — SIGNIFICANT CHANGE UP (ref 96–108)
CO2 SERPL-SCNC: 19 MMOL/L — LOW (ref 22–29)
CO2 SERPL-SCNC: 22 MMOL/L — SIGNIFICANT CHANGE UP (ref 22–29)
CREAT SERPL-MCNC: 0.82 MG/DL — SIGNIFICANT CHANGE UP (ref 0.5–1.3)
CREAT SERPL-MCNC: 0.83 MG/DL — SIGNIFICANT CHANGE UP (ref 0.5–1.3)
EGFR: 73 ML/MIN/1.73M2 — SIGNIFICANT CHANGE UP
EGFR: 75 ML/MIN/1.73M2 — SIGNIFICANT CHANGE UP
GLUCOSE BLDC GLUCOMTR-MCNC: 163 MG/DL — HIGH (ref 70–99)
GLUCOSE BLDC GLUCOMTR-MCNC: 178 MG/DL — HIGH (ref 70–99)
GLUCOSE SERPL-MCNC: 197 MG/DL — HIGH (ref 70–99)
GLUCOSE SERPL-MCNC: 199 MG/DL — HIGH (ref 70–99)
HCT VFR BLD CALC: 36.2 % — SIGNIFICANT CHANGE UP (ref 34.5–45)
HGB BLD-MCNC: 12.5 G/DL — SIGNIFICANT CHANGE UP (ref 11.5–15.5)
MCHC RBC-ENTMCNC: 32.1 PG — SIGNIFICANT CHANGE UP (ref 27–34)
MCHC RBC-ENTMCNC: 34.5 GM/DL — SIGNIFICANT CHANGE UP (ref 32–36)
MCV RBC AUTO: 92.8 FL — SIGNIFICANT CHANGE UP (ref 80–100)
PLATELET # BLD AUTO: 97 K/UL — LOW (ref 150–400)
POTASSIUM SERPL-MCNC: 4.6 MMOL/L — SIGNIFICANT CHANGE UP (ref 3.5–5.3)
POTASSIUM SERPL-MCNC: 5.6 MMOL/L — HIGH (ref 3.5–5.3)
POTASSIUM SERPL-SCNC: 4.6 MMOL/L — SIGNIFICANT CHANGE UP (ref 3.5–5.3)
POTASSIUM SERPL-SCNC: 5.6 MMOL/L — HIGH (ref 3.5–5.3)
RBC # BLD: 3.9 M/UL — SIGNIFICANT CHANGE UP (ref 3.8–5.2)
RBC # FLD: 14.6 % — HIGH (ref 10.3–14.5)
SODIUM SERPL-SCNC: 137 MMOL/L — SIGNIFICANT CHANGE UP (ref 135–145)
SODIUM SERPL-SCNC: 140 MMOL/L — SIGNIFICANT CHANGE UP (ref 135–145)
WBC # BLD: 9.63 K/UL — SIGNIFICANT CHANGE UP (ref 3.8–10.5)
WBC # FLD AUTO: 9.63 K/UL — SIGNIFICANT CHANGE UP (ref 3.8–10.5)

## 2024-04-05 PROCEDURE — 36415 COLL VENOUS BLD VENIPUNCTURE: CPT

## 2024-04-05 PROCEDURE — 99232 SBSQ HOSP IP/OBS MODERATE 35: CPT

## 2024-04-05 PROCEDURE — 86850 RBC ANTIBODY SCREEN: CPT

## 2024-04-05 PROCEDURE — 85027 COMPLETE CBC AUTOMATED: CPT

## 2024-04-05 PROCEDURE — 73560 X-RAY EXAM OF KNEE 1 OR 2: CPT

## 2024-04-05 PROCEDURE — C1889: CPT

## 2024-04-05 PROCEDURE — 82962 GLUCOSE BLOOD TEST: CPT

## 2024-04-05 PROCEDURE — 80048 BASIC METABOLIC PNL TOTAL CA: CPT

## 2024-04-05 PROCEDURE — 86900 BLOOD TYPING SEROLOGIC ABO: CPT

## 2024-04-05 PROCEDURE — C1776: CPT

## 2024-04-05 PROCEDURE — 86901 BLOOD TYPING SEROLOGIC RH(D): CPT

## 2024-04-05 PROCEDURE — 27447 TOTAL KNEE ARTHROPLASTY: CPT

## 2024-04-05 PROCEDURE — C1713: CPT

## 2024-04-05 PROCEDURE — 97163 PT EVAL HIGH COMPLEX 45 MIN: CPT

## 2024-04-05 RX ORDER — OXYCODONE HYDROCHLORIDE 5 MG/1
1 TABLET ORAL
Qty: 20 | Refills: 0
Start: 2024-04-05 | End: 2024-04-09

## 2024-04-05 RX ORDER — SENNA PLUS 8.6 MG/1
2 TABLET ORAL
Qty: 10 | Refills: 0
Start: 2024-04-05 | End: 2024-04-09

## 2024-04-05 RX ORDER — PANTOPRAZOLE SODIUM 20 MG/1
1 TABLET, DELAYED RELEASE ORAL
Qty: 42 | Refills: 0
Start: 2024-04-05 | End: 2024-05-16

## 2024-04-05 RX ORDER — CELECOXIB 200 MG/1
1 CAPSULE ORAL
Qty: 42 | Refills: 0
Start: 2024-04-05 | End: 2024-04-25

## 2024-04-05 RX ORDER — ASPIRIN/CALCIUM CARB/MAGNESIUM 324 MG
1 TABLET ORAL
Qty: 84 | Refills: 0
Start: 2024-04-05 | End: 2024-05-16

## 2024-04-05 RX ORDER — ACETAMINOPHEN 500 MG
3 TABLET ORAL
Qty: 0 | Refills: 0 | DISCHARGE
Start: 2024-04-05

## 2024-04-05 RX ORDER — NALOXONE HYDROCHLORIDE 4 MG/.1ML
1 SPRAY NASAL
Qty: 1 | Refills: 0
Start: 2024-04-05

## 2024-04-05 RX ORDER — ASPIRIN/CALCIUM CARB/MAGNESIUM 324 MG
1 TABLET ORAL
Refills: 0 | DISCHARGE

## 2024-04-05 RX ADMIN — Medication 400 MILLIGRAM(S): at 05:12

## 2024-04-05 RX ADMIN — Medication 15 MILLIGRAM(S): at 13:04

## 2024-04-05 RX ADMIN — Medication 2000 MILLIGRAM(S): at 03:17

## 2024-04-05 RX ADMIN — PANTOPRAZOLE SODIUM 40 MILLIGRAM(S): 20 TABLET, DELAYED RELEASE ORAL at 05:12

## 2024-04-05 RX ADMIN — Medication 2: at 07:52

## 2024-04-05 RX ADMIN — Medication 2: at 13:03

## 2024-04-05 RX ADMIN — Medication 1 TABLET(S): at 13:04

## 2024-04-05 RX ADMIN — Medication 15 MILLIGRAM(S): at 05:12

## 2024-04-05 RX ADMIN — Medication 15 MILLIGRAM(S): at 00:08

## 2024-04-05 RX ADMIN — Medication 81 MILLIGRAM(S): at 05:12

## 2024-04-05 RX ADMIN — Medication 1000 MILLIGRAM(S): at 05:16

## 2024-04-05 RX ADMIN — Medication 15 MILLIGRAM(S): at 05:16

## 2024-04-05 NOTE — PROGRESS NOTE ADULT - SUBJECTIVE AND OBJECTIVE BOX
TRISTIN BARRIGA    [id number]    History: 74y Female is status post right total knee arthroplasty POD # 1. Patient is doing well and is comfortable. The patient's pain is controlled using the prescribed pain medications. The patient is participating in physical therapy. Denies nausea, vomiting, chest pain, shortness of breath, abdominal pain or fever. No new complaints.                            12.5   9.63  )-----------( x        ( 05 Apr 2024 06:35 )             36.2             MEDICATIONS  (STANDING):  acetaminophen     Tablet .. 975 milliGRAM(s) Oral every 8 hours  aspirin enteric coated 81 milliGRAM(s) Oral two times a day  atorvastatin 40 milliGRAM(s) Oral at bedtime  dextrose 10% Bolus 125 milliLiter(s) IV Bolus once  dextrose 5%. 1000 milliLiter(s) (100 mL/Hr) IV Continuous <Continuous>  dextrose 5%. 1000 milliLiter(s) (50 mL/Hr) IV Continuous <Continuous>  dextrose 50% Injectable 25 Gram(s) IV Push once  dextrose 50% Injectable 12.5 Gram(s) IV Push once  glucagon  Injectable 1 milliGRAM(s) IntraMuscular once  insulin lispro (ADMELOG) corrective regimen sliding scale   SubCutaneous Before meals and at bedtime  ketorolac   Injectable 15 milliGRAM(s) IV Push every 6 hours  multivitamin 1 Tablet(s) Oral daily  pantoprazole    Tablet 40 milliGRAM(s) Oral before breakfast  polyethylene glycol 3350 17 Gram(s) Oral at bedtime  senna 2 Tablet(s) Oral at bedtime  sodium chloride 0.9%. 1000 milliLiter(s) (100 mL/Hr) IV Continuous <Continuous>    MEDICATIONS  (PRN):  dextrose Oral Gel 15 Gram(s) Oral once PRN Blood Glucose LESS THAN 70 milliGRAM(s)/deciliter  HYDROmorphone   Tablet 4 milliGRAM(s) Oral every 4 hours PRN Severe Pain (7 - 10)  magnesium hydroxide Suspension 30 milliLiter(s) Oral daily PRN Constipation  ondansetron Injectable 4 milliGRAM(s) IV Push every 6 hours PRN Nausea and/or Vomiting  oxyCODONE    IR 10 milliGRAM(s) Oral every 3 hours PRN Moderate Pain (4 - 6)  oxyCODONE    IR 5 milliGRAM(s) Oral every 3 hours PRN Mild Pain (1 - 3)      Physical exam: The right knee Mepilex dressing is clean, dry and intact. No drainage or discharge. No erythema is noted. No blistering. No ecchymosis. The calf is supple nontender.  Sensation to light touch is grossly intact distally. Extensor hallucis longus and flexor hallucis longus are intact. No foot drop. 2+ dorsalis pedis pulse. Capillary refill is less than 2 seconds.     Primary Orthopedic Assessment:  • 74y Female is status post right total knee arthroplasty POD # 1.    Secondary  Orthopedic Assessment(s):   •     Secondary  Medical Assessment(s):   •     Plan:   • DVT prophylaxis as prescribed, including use of compression devices and ankle pumps  • Continue physical therapy  • Weightbearing as tolerated of the right lower extremity with assistance of a walker  • Incentive spirometry encouraged  • Pain control as clinically indicated  • Discharge planning – anticipated discharge is for home today.   
TRISTIN BARRIGA  [id number]    History: 74y Female is status post right total knee arthroplasty on 4/4/24, POD #0. Patient is doing well and is comfortable. The patient's pain is controlled using the prescribed pain medications. Denies nausea, vomiting, and chest pain. No new complaints.    MEDICATIONS  (STANDING):  acetaminophen     Tablet .. 975 milliGRAM(s) Oral every 8 hours  acetaminophen   IVPB .. 1000 milliGRAM(s) IV Intermittent once  aspirin enteric coated 81 milliGRAM(s) Oral two times a day  atorvastatin 40 milliGRAM(s) Oral at bedtime  ceFAZolin  Injectable. 2000 milliGRAM(s) IV Push <User Schedule>  dextrose 10% Bolus 125 milliLiter(s) IV Bolus once  dextrose 5%. 1000 milliLiter(s) (100 mL/Hr) IV Continuous <Continuous>  dextrose 5%. 1000 milliLiter(s) (50 mL/Hr) IV Continuous <Continuous>  dextrose 50% Injectable 25 Gram(s) IV Push once  dextrose 50% Injectable 12.5 Gram(s) IV Push once  glucagon  Injectable 1 milliGRAM(s) IntraMuscular once  insulin lispro (ADMELOG) corrective regimen sliding scale   SubCutaneous Before meals and at bedtime  ketorolac   Injectable 15 milliGRAM(s) IV Push every 6 hours  multivitamin 1 Tablet(s) Oral daily  pantoprazole    Tablet 40 milliGRAM(s) Oral before breakfast  polyethylene glycol 3350 17 Gram(s) Oral at bedtime  senna 2 Tablet(s) Oral at bedtime  sodium chloride 0.9%. 1000 milliLiter(s) (100 mL/Hr) IV Continuous <Continuous>    MEDICATIONS  (PRN):  dextrose Oral Gel 15 Gram(s) Oral once PRN Blood Glucose LESS THAN 70 milliGRAM(s)/deciliter  HYDROmorphone   Tablet 4 milliGRAM(s) Oral every 4 hours PRN Severe Pain (7 - 10)  magnesium hydroxide Suspension 30 milliLiter(s) Oral daily PRN Constipation  ondansetron Injectable 4 milliGRAM(s) IV Push every 6 hours PRN Nausea and/or Vomiting  oxyCODONE    IR 10 milliGRAM(s) Oral every 3 hours PRN Moderate Pain (4 - 6)  oxyCODONE    IR 5 milliGRAM(s) Oral every 3 hours PRN Mild Pain (1 - 3)    Physical exam: The right knee dressing is clean, dry and intact. No calf tenderness. Sensation to light touch is grossly intact distally. Motor function distally is 5/5. Extensor hallucis longus and flexor hallucis longus are intact. No foot drop. 2+ dorsalis pedis pulse. Capillary refill is less than 2 seconds. No cyanosis.    Primary Orthopedic Assessment:  • s/p RIGHT total knee replacement    Plan:   • DVT prophylaxis as prescribed (Aspirin 81mg BID), including use of compression devices and ankle pumps  • Continue physical therapy  • Weightbearing as tolerated of the right lower extremity with assistance of a walker  • Incentive spirometry encouraged  • Pain control as clinically indicated  • Discharge planning – anticipated discharge is Home
CC: right total knee arthroplasty (04 Apr 2024 17:23)    HPI:  74-year-old female with PMH morbidly obese, DM (Current not on any medication s/p gastric sleeve 1/29/24 [facility]), CABRERA unable to tolerate CPAP, HTN, HLD, GERD, Chronic constipation, MI presents with longstanding bilateral knee pain, right knee pain worse than the left knee. Denied any trauma and fall, stated "bone-on-bone." Patient reported right knee pain was localized to the medial aspect of the knee, pain was aggravated with weight bearing activity, walking, climbing up and down the stairs and lying on her right side. Patient reported prior conservative treatment included physical therapy and cortisone injections without any significant improvement. Patient was taking Tylenol and NSAIDs for pain with mild relief.  Pt was initially scheduled for this surgery in August 2023 but was rescheduled  for elevated hemoglobin A1c. Patient now s/p right TKA POD #1.     INTERVAL HPI/OVERNIGHT EVENTS: Patient seen and examined sitting up in bed.  Patient reports pain controlled.  Patient denies any headache, dizziness, SOB, CP, abdominal pain, nausea, vomiting, dysuria.  Other ROS reviewed and are negative.      Vital Signs Last 24 Hrs  T(C): 36.4 (05 Apr 2024 08:00), Max: 36.9 (04 Apr 2024 21:16)  T(F): 97.5 (05 Apr 2024 08:00), Max: 98.5 (04 Apr 2024 21:16)  HR: 61 (05 Apr 2024 08:00) (61 - 74)  BP: 121/74 (05 Apr 2024 08:00) (121/74 - 153/75)  BP(mean): --  RR: 18 (05 Apr 2024 08:00) (15 - 19)  SpO2: 93% (05 Apr 2024 08:00) (92% - 100%)    Parameters below as of 05 Apr 2024 08:00  Patient On (Oxygen Delivery Method): room air      I&O's Detail    04 Apr 2024 07:01  -  05 Apr 2024 07:00  --------------------------------------------------------  IN:    sodium chloride 0.9%: 1200 mL  Total IN: 1200 mL    OUT:    Voided (mL): 1400 mL  Total OUT: 1400 mL    Total NET: -200 mL    PHYSICAL EXAM:  GENERAL: NAD  HEAD:  Atraumatic, Normocephalic  EYES: conjunctiva and sclera clear  NECK: Supple, No JVD, Normal thyroid  NERVOUS SYSTEM:  Alert & Oriented X3, Good concentration; Motor Strength 5/5 B/L upper and lower extremities  CHEST/LUNG: Clear to auscultation bilaterally  HEART: Regular rate and rhythm; No murmurs, rubs, or gallops  ABDOMEN: Soft, Nontender, Nondistended; Bowel sounds present  EXTREMITIES:  2+ Peripheral Pulses, Right knee with clean dressing  SKIN: No rashes or lesions                          12.5   9.63  )-----------( 97       ( 05 Apr 2024 06:35 )             36.2     05 Apr 2024 06:35    137    |  104    |  17.1   ----------------------------<  199    5.6     |  19.0   |  0.82     Ca    8.7        05 Apr 2024 06:35        CAPILLARY BLOOD GLUCOSE  POCT Blood Glucose.: 178 mg/dL (05 Apr 2024 07:48)  POCT Blood Glucose.: 227 mg/dL (04 Apr 2024 21:43)  POCT Blood Glucose.: 273 mg/dL (04 Apr 2024 18:01)  POCT Blood Glucose.: 151 mg/dL (04 Apr 2024 13:38)  POCT Blood Glucose.: 124 mg/dL (04 Apr 2024 12:01)      Urinalysis Basic - ( 05 Apr 2024 06:35 )    Color: x / Appearance: x / SG: x / pH: x  Gluc: 199 mg/dL / Ketone: x  / Bili: x / Urobili: x   Blood: x / Protein: x / Nitrite: x   Leuk Esterase: x / RBC: x / WBC x   Sq Epi: x / Non Sq Epi: x / Bacteria: x        MEDICATIONS  (STANDING):  acetaminophen     Tablet .. 975 milliGRAM(s) Oral every 8 hours  aspirin enteric coated 81 milliGRAM(s) Oral two times a day  atorvastatin 40 milliGRAM(s) Oral at bedtime  dextrose 10% Bolus 125 milliLiter(s) IV Bolus once  dextrose 5%. 1000 milliLiter(s) (100 mL/Hr) IV Continuous <Continuous>  dextrose 5%. 1000 milliLiter(s) (50 mL/Hr) IV Continuous <Continuous>  dextrose 50% Injectable 25 Gram(s) IV Push once  dextrose 50% Injectable 12.5 Gram(s) IV Push once  glucagon  Injectable 1 milliGRAM(s) IntraMuscular once  insulin lispro (ADMELOG) corrective regimen sliding scale   SubCutaneous Before meals and at bedtime  ketorolac   Injectable 15 milliGRAM(s) IV Push every 6 hours  multivitamin 1 Tablet(s) Oral daily  pantoprazole    Tablet 40 milliGRAM(s) Oral before breakfast  polyethylene glycol 3350 17 Gram(s) Oral at bedtime  senna 2 Tablet(s) Oral at bedtime  sodium chloride 0.9%. 1000 milliLiter(s) (100 mL/Hr) IV Continuous <Continuous>    MEDICATIONS  (PRN):  dextrose Oral Gel 15 Gram(s) Oral once PRN Blood Glucose LESS THAN 70 milliGRAM(s)/deciliter  HYDROmorphone   Tablet 4 milliGRAM(s) Oral every 4 hours PRN Severe Pain (7 - 10)  magnesium hydroxide Suspension 30 milliLiter(s) Oral daily PRN Constipation  ondansetron Injectable 4 milliGRAM(s) IV Push every 6 hours PRN Nausea and/or Vomiting  oxyCODONE    IR 10 milliGRAM(s) Oral every 3 hours PRN Moderate Pain (4 - 6)  oxyCODONE    IR 5 milliGRAM(s) Oral every 3 hours PRN Mild Pain (1 - 3)      RADIOLOGY & ADDITIONAL TESTS:  < from: Xray Knee 1 or 2 Views, Right (04.04.24 @ 13:52) >  ACC: [id number] EXAM:  XR KNEE 1-2 VIEWS RT   ORDERED BY: JR MALAVE     PROCEDURE DATE:  04/04/2024          INTERPRETATION:  2 intraoperative radiographs of the right knee were   performed for prosthesis evaluation.    There are no prior studies for comparison.    The patient is undergone right knee arthroplasty, showing good anatomic   alignment and no sign of immediate postprocedure, location. There are   typical expected findings in and around the joint space in the immediate   postoperative period, including fluid, edema and air.    IMPRESSION: Successful right knee arthroplasty with no evidence of   immediate postprocedure complication.    --- End of Report ---            DOMITILA SHAW MD; Attending Radiologist  This document has been electronically signed. Apr 4 2024  4:23PM    < end of copied text >

## 2024-04-05 NOTE — PROGRESS NOTE ADULT - ASSESSMENT
74-year-old female with PMH morbidly obese, DM (Current not on any medication s/p gastric sleeve 1/29/24 [facility]), CABRERA unable to tolerate CPAP, HTN, HLD, GERD, Chronic constipation, MI presents with longstanding bilateral knee pain, right knee pain worse than the left knee. Denied any trauma and fall, stated "bone-on-bone." Patient reported right knee pain was localized to the medial aspect of the knee, pain was aggravated with weight bearing activity, walking, climbing up and down the stairs and lying on her right side. Patient reported prior conservative treatment included physical therapy and cortisone injections without any significant improvement. Patient was taking Tylenol and NSAIDs for pain with mild relief.  Pt was initially scheduled for this surgery in August 2023 but was rescheduled  for elevated hemoglobin A1c. Patient now s/p right TKA POD #1.     Right knee OA  S/p Right TKA POD #1.  Pain control.  PT/OT.  Antibiotics, wound care and DVT prophylaxis as per Ortho.  Incentive spirometry.  Avoid opioid induced constipation.    Hyperkalemia  Hemolyzed.  Will recheck BMP.    HTN  Resume Losartan on POD #2.    HLD  Continue statin.    DM2  Patient no longer on medications after gastric sleeve 1/29/24.  A1c 6.7.  Accuchecks qAC and qHS with sliding scale coverage.  ADA diet    CABRERA  Patient unable to tolerate CPAP.    DVT prophylaxis  ASA BID as per Ortho.  Noted with drop in platelets, would recommend repeat CBC with PMD in 1 week. 74-year-old female with PMH morbidly obese, DM (Current not on any medication s/p gastric sleeve 1/29/24 [facility]), CABRERA unable to tolerate CPAP, HTN, HLD, GERD, Chronic constipation, MI presents with longstanding bilateral knee pain, right knee pain worse than the left knee. Denied any trauma and fall, stated "bone-on-bone." Patient reported right knee pain was localized to the medial aspect of the knee, pain was aggravated with weight bearing activity, walking, climbing up and down the stairs and lying on her right side. Patient reported prior conservative treatment included physical therapy and cortisone injections without any significant improvement. Patient was taking Tylenol and NSAIDs for pain with mild relief.  Pt was initially scheduled for this surgery in August 2023 but was rescheduled  for elevated hemoglobin A1c. Patient now s/p right TKA POD #1.     Right knee OA  S/p Right TKA POD #1.  Pain control.  PT/OT.  Antibiotics, wound care and DVT prophylaxis as per Ortho.  Incentive spirometry.  Avoid opioid induced constipation.    Hyperkalemia  Hemolyzed.  Repeat K 4.6.    HTN  Resume Losartan on POD #2.    HLD  Continue statin.    DM2  Patient no longer on medications after gastric sleeve 1/29/24.  A1c 6.7.  Accuchecks qAC and qHS with sliding scale coverage.  ADA diet    CABRERA  Patient unable to tolerate CPAP.    DVT prophylaxis  ASA BID as per Ortho.  Noted with drop in platelets, would recommend repeat CBC with PMD in 1 week.    Patient medically stable for discharge.

## 2024-04-06 ENCOUNTER — TRANSCRIPTION ENCOUNTER (OUTPATIENT)
Age: 74
End: 2024-04-06

## 2024-04-08 ENCOUNTER — TRANSCRIPTION ENCOUNTER (OUTPATIENT)
Age: 74
End: 2024-04-08

## 2024-04-18 ENCOUNTER — TRANSCRIPTION ENCOUNTER (OUTPATIENT)
Age: 74
End: 2024-04-18

## 2024-04-26 ENCOUNTER — APPOINTMENT (OUTPATIENT)
Dept: ORTHOPEDIC SURGERY | Facility: CLINIC | Age: 74
End: 2024-04-26
Payer: MEDICARE

## 2024-04-26 PROCEDURE — 99024 POSTOP FOLLOW-UP VISIT: CPT

## 2024-04-26 PROCEDURE — 73562 X-RAY EXAM OF KNEE 3: CPT | Mod: 26,RT

## 2024-04-26 RX ORDER — TRAMADOL HYDROCHLORIDE 50 MG/1
50 TABLET, COATED ORAL
Qty: 42 | Refills: 0 | Status: ACTIVE | COMMUNITY
Start: 2024-04-26 | End: 1900-01-01

## 2024-04-26 NOTE — HISTORY OF PRESENT ILLNESS
[___ Weeks Post Op] : [unfilled] weeks post op [Chills] : no chills [Constipation] : no constipation [Diarrhea] : no diarrhea [Dysuria] : no dysuria [Fever] : no fever [Nausea] : no nausea [Vomiting] : no vomiting [Clean/Dry/Intact] : clean, dry and intact [Healed] : healed [Erythema] : not erythematous [Discharge] : absent of discharge [Swelling] : swollen [Dehiscence] : not dehisced [de-identified] : POS: Right total knee arthroplasty. Arvis augmented reality assisted total knee arthroplasty. DOS: 04/04/2024 [de-identified] : Patient is 3 weeks postop right total knee arthroplasty she is doing well overall.  She is starting outpatient physical therapy next week.  She was taking oxycodone for pain which she did not do well with.  She is currently only taking Tylenol but feels she needs medication at nighttime. [de-identified] : Right knee range of motion 5/110. [de-identified] : Three-view imaging of the right knee shows well-fixed implants no subsidence loosening or wear [de-identified] : Postoperatively, the patient is doing well, has excellent pain control and is showing no signs of infection. [de-identified] : Patient will continue with the physical therapy and low impact exercise I sent tramadol for patient to take prior to physical therapy and at nighttime.  She also continue Celebrex. I discussed use of antibiotic before dental work for 2 years. follow-up in 1 month.

## 2024-04-29 ENCOUNTER — TRANSCRIPTION ENCOUNTER (OUTPATIENT)
Age: 74
End: 2024-04-29

## 2024-04-30 ENCOUNTER — TRANSCRIPTION ENCOUNTER (OUTPATIENT)
Age: 74
End: 2024-04-30

## 2024-05-09 ENCOUNTER — NON-APPOINTMENT (OUTPATIENT)
Age: 74
End: 2024-05-09

## 2024-05-22 ENCOUNTER — APPOINTMENT (OUTPATIENT)
Dept: PULMONOLOGY | Facility: CLINIC | Age: 74
End: 2024-05-22

## 2024-06-10 ENCOUNTER — APPOINTMENT (OUTPATIENT)
Dept: ORTHOPEDIC SURGERY | Facility: CLINIC | Age: 74
End: 2024-06-10
Payer: MEDICARE

## 2024-06-10 DIAGNOSIS — Z47.1 AFTERCARE FOLLOWING JOINT REPLACEMENT SURGERY: ICD-10-CM

## 2024-06-10 DIAGNOSIS — Z96.651 AFTERCARE FOLLOWING JOINT REPLACEMENT SURGERY: ICD-10-CM

## 2024-06-10 DIAGNOSIS — Z96.651 PRESENCE OF RIGHT ARTIFICIAL KNEE JOINT: ICD-10-CM

## 2024-06-10 PROCEDURE — 73562 X-RAY EXAM OF KNEE 3: CPT | Mod: RT

## 2024-06-10 PROCEDURE — 99024 POSTOP FOLLOW-UP VISIT: CPT

## 2024-06-10 NOTE — END OF VISIT
[FreeTextEntry3] : I, Angel Martinez, acted solely as a scribe for Dr. Jhonathan Kerns on 06/10/2024.  I personally performed the services described in the documentation, reviewed the documentation recorded by the scribe in my presence, and it accurately and completely records my words and actions.

## 2024-06-10 NOTE — HISTORY OF PRESENT ILLNESS
[___ Weeks Post Op] : [unfilled] weeks post op [Xray (Date:___)] : [unfilled] Xray -  [Doing Well] : is doing well [No Sign of Infection] : is showing no signs of infection [2] : the patient reports pain that is 2/10 in severity [Chills] : no chills [Constipation] : no constipation [Diarrhea] : no diarrhea [Dysuria] : no dysuria [Fever] : no fever [Nausea] : no nausea [Vomiting] : no vomiting [Clean/Dry/Intact] : clean, dry and intact [Healed] : healed [Erythema] : not erythematous [Discharge] : absent of discharge [Swelling] : swollen [Dehiscence] : not dehisced [Neuro Intact] : an unremarkable neurological exam [Vascular Intact] : ~T peripheral vascular exam normal [Negative Mely's] : maneuvers demonstrated a negative Mely's sign [Adequate Pain Control] : has adequate pain control [de-identified] : POS: Right total knee arthroplasty. Arvis augmented reality assisted total knee arthroplasty. DOS: 04/04/2024 [de-identified] : 73 y/o F pt presents 2 months post op from right TKA. The pt is PT. The pt walks with a cane. Patient has some medial side pain. She notes the pain effects her ability to properly sleep. She notes she is slowly improving. Does not use any pain medication. She is overall happy with the surgical outcome.  [de-identified] :  Right knee exam shows healing incision with no sign of infection, ROM 0 to 100 degree. The surgical incision site(s) swollen, but clean, dry and intact, healed, not erythematous and not dehisced. Additional findings included an unremarkable neurological exam, peripheral vascular exam normal and maneuvers demonstrated a negative Mely's sign.   [de-identified] :  3V xray of the right knee done in the office today and reviewed and demonstrates s/p implants in good positioning with no evidence of wear, loosening, or subsidence.   [de-identified] : She should continue to do low impact exercises. Pt understands the importance of prophylaxis for invasive dental procedures. F/u with us a year from surgery. The pt may f/u sooner if her pain worsens.

## 2024-06-20 NOTE — DISCUSSION/SUMMARY
[de-identified] : 72 y/o F pt presents with bone-on-bone medial compartmental osteoarthritis of the right knee. The nature of her condition and treatment options were discussed in detail. The pt is a candidate for a right TKA. The surgery was discussed in detail. The pt has completed at least 3 months of conservative treatment such as cortisone injections, NSAIDs, PT, low impact exercise, and Tylenol. The pt has worsening pain with ambulation, stairs, and bending. The pt understands her A1c needs to be below 8.0 prior to surgery. The pt will talk with the surgical coordinator to re-schedule a right TKA. All questions were answered.   The patient is a 73 year old individual with end stage arthritis of their right knee joint. The patient has exhausted a minimum of 3 months conservative treatment including prior injections (cortisone and/or hyaluronic acid injections), physical therapy, over the counter NSAIDS and pain medication where indicated. In addition, the patient's quality of life is diminished due to significant chronic pain. The patient is having difficulty with activities of daily living, including ambulating, descending stairs, and rising from a seated position. Based upon the patient's continued symptoms and failure to respond to conservative treatment, I have recommended a right total knee replacement for this patient. A long discussion took place with the patient describing what a total joint replacement is and what the procedure would entail. A knee model, similar to the implant that will be used during the operation, was utilized to demonstrate and to discuss the various bearing surfaces of the implants. Implant fixation, use of cement, was also discussed with the patient. Choices of implant manufacturers, mainly [employer] and [employer], were discussed and reviewed with preference to be made by patient and surgeon prior to operation. Final selection to be based on customary practice as well as preoperative templating with selection confirmed intraoperatively based on the patient's anatomy. The patient participated and agreed with the decision-making process. The hospitalization and post-operative care and rehabilitation were also discussed. The use of perioperative antibiotics and DVT prophylaxis were discussed. The risk, benefits and alternatives to a surgical intervention were discussed at length with the patient. The patient was also advised of risks related to the medical comorbidities and elevated body mass index (BMI). A lengthy discussion took place to review the most common complications including but not limited to: deep vein thrombosis, pulmonary embolism, heart attack, stroke, infection, wound breakdown, numbness, damage to nerves, tendon, muscles, arteries or other blood vessels, death and other possible complications from anesthesia. The patient was told that we will take steps to minimize these risks by using sterile technique, antibiotics and DVT prophylaxis when appropriate and follow the patient postoperatively in the office setting to monitor progress. The possibility of recurrent pain, no improvement in pain and actual worsening of pain were also discussed with the patient.  The discharge plan of care focused on the patient going home following surgery. The patient was encouraged to make the necessary arrangements to have someone stay with them when they are discharged home. Following discharge, a home care nurse will visit the patient. The home care nurse will open your home care case and request home physical therapy services. Home physical therapy will commence following discharge provided it is appropriate and covered by the health insurance benefit plan.  The benefits of surgery were discussed with the patient including the potential for improving his/her current clinical condition through operative intervention. Alternatives to surgical intervention including continued conservative management were also discussed in detail. All questions were answered to the satisfaction of the patient. The treatment plan of care, as well as a model of a total knee equivalent to the one that will be used for their total joint replacement, was shared with the patient. The patient participated and agreed to the plan of care as well as the use of the recommended implants for their total joint replacement surgery.

## 2024-06-20 NOTE — END OF VISIT
[FreeTextEntry3] : I, Shabbir Ellis, acted solely as a scribe for Dr. Jhonathan Kerns on this date 06/07/2021.   I, Jhonathan Kerns MD, personally performed the services described in the documentation, reviewed the documentation recorded by the scribe in my presence and it accurately and completely records my words and actions

## 2024-06-20 NOTE — DISCUSSION/SUMMARY
[Medication Risks Reviewed] : Medication risks reviewed [de-identified] : 70 y/o F with moderate medial compartmental osteoarthritis of the left knee and advanced medial compartmental osteoarthritis of the right knee. She has more pain in the right knee than in the left knee. The patient is not yet a candidate for a right TKA; however, she may be a candidate in the future. We encourage her to exhaust conservative therapies. The conservative therapies we discussed included cortisone injections and antiinflammatories. She defers a cortisone injection today, but she would like to receive an injection prior to her daughter's wedding in September. Additionally, we prescribed meloxicam today for pain management.

## 2024-06-20 NOTE — HISTORY OF PRESENT ILLNESS
[de-identified] : This is a 73-year-old female presents with right knee pain. The pt has a hx of bone-on-bone medial compartmental osteoarthritis of the right knee. The pt was initially scheduled for a right TKA in August but was postponed for elevated hemoglobin A1c.  She reports a month ago her A1c was 7.4 she would like to reschedule the surgery today.  She has had more than 3 months of physical therapy and has had numerous cortisone injections in the past without any relief of pain.  She takes Tylenol and NSAIDs for pain with no relief

## 2024-06-20 NOTE — PHYSICAL EXAM
[Cane] : ambulates with cane [LE] : Sensory: Intact in bilateral lower extremities [ALL] : dorsalis pedis, posterior tibial, femoral, popliteal, and radial 2+ and symmetric bilaterally [Normal] : Alert and in no acute distress [Antalgic] : not antalgic [Poor Appearance] : well-appearing [de-identified] : GENERAL APPEARANCE: Well nourished and hydrated, pleasant, alert, and oriented x 3. Appears their stated age. \par HEENT: Normocephalic, extraocular eye motion intact. Nasal septum midline. Oral cavity clear. External auditory canal clear. \par RESPIRATORY: Breath sounds clear and audible in all lobes. No wheezing, No accessory muscle use.\par CARDIOVASCULAR: No apparent abnormalities. No lower leg edema. No varicosities. Pedal pulses are palpable.\par NEUROLOGIC: Sensation is normal, no muscle weakness in the upper or lower extremities.\par DERMATOLOGIC: No apparent skin lesions, moist, warm, no rash.\par SPINE: Cervical spine appears normal and moves freely; thoracic spine appears normal and moves freely; lumbosacral spine appears normal and moves freely, normal, nontender.\par MUSCULOSKELETAL: Hands, wrists, and elbows are normal and move freely, shoulders are normal and move freely.  [de-identified] : b/l knee exam showed varus, medial joint line tenderness and pain. ROM is 0-100 degree\par  no effusion  [de-identified] : 5V xray of the b/l knee done in the office today and reviewed by Dr. Jhonathan Kerns demonstrates moderate medial compartmental osteoarthritis of the left knee and advanced medial compartmental osteoarthritis of the right knee

## 2024-06-20 NOTE — PHYSICAL EXAM
[de-identified] : GENERAL APPEARANCE: Well nourished and hydrated, pleasant, alert, and oriented x 3. Appears their stated age. \par  HEENT: Normocephalic, extraocular eye motion intact. Nasal septum midline. Oral cavity clear. External auditory canal clear. \par  RESPIRATORY: Breath sounds clear and audible in all lobes. No wheezing, No accessory muscle use.\par  CARDIOVASCULAR: No apparent abnormalities. No lower leg edema. No varicosities. Pedal pulses are palpable.\par  NEUROLOGIC: Sensation is normal, no muscle weakness in the upper or lower extremities.\par  DERMATOLOGIC: No apparent skin lesions, moist, warm, no rash.\par  SPINE: Cervical spine appears normal and moves freely; thoracic spine appears normal and moves freely; lumbosacral spine appears normal and moves freely, normal, nontender.\par  MUSCULOSKELETAL: Hands, wrists, and elbows are normal and move freely, shoulders are normal and move freely. \par  Musculoskeletal\par  5/5 motor strength in bilateral lower extremities. Sensory: Intact in bilateral lower extremities. DTRs: Biceps, brachioradialis, triceps, patellar, ankle and plantar 2+ and symmetric bilaterally. Pulses: dorsalis pedis, posterior tibial, femoral, popliteal, and radial 2+ and symmetric bilaterally. \par  Constitutional: Alert and in no acute distress, but well-appearing. \par   [de-identified] : right knee exam showed varus alligment, medial joint line tenderness and pain. ROM is 0-100 degree\par   no effusion

## 2024-06-20 NOTE — END OF VISIT
[FreeTextEntry3] : I, Angel Martinez, acted solely as a scribe for Dr. Jhonathan Kerns on 11/22/2023   I, Jhonathan Kerns MD, personally performed the services described in the documentation, reviewed the documentation recorded by the scribe in my presence and it accurately and completely records my words and actions

## 2024-06-20 NOTE — HISTORY OF PRESENT ILLNESS
[Pain Location] : pain [Worsening] : worsening [___ yrs] : [unfilled] year(s) ago [4] : a current pain level of 4/10 [6] : a maximum pain level of 6/10 [Walking] : worsened by walking [Rest] : relieved by rest [de-identified] : 72 y/o F presents with b/l knee pain, right worse then left for about 1 year. The pain is in the medial aspect of the knee. She reports occasional bucking in the right knee. She ambulates with a cane. She is not using any pain medication. She had no physician guided treatment yet. She is having pain with stairs.  [de-identified] : stairs

## 2024-06-26 ENCOUNTER — TRANSCRIPTION ENCOUNTER (OUTPATIENT)
Age: 74
End: 2024-06-26

## 2025-01-30 ENCOUNTER — APPOINTMENT (OUTPATIENT)
Age: 75
End: 2025-01-30
Payer: MEDICARE

## 2025-01-30 DIAGNOSIS — Z96.651 AFTERCARE FOLLOWING JOINT REPLACEMENT SURGERY: ICD-10-CM

## 2025-01-30 DIAGNOSIS — Z47.1 AFTERCARE FOLLOWING JOINT REPLACEMENT SURGERY: ICD-10-CM

## 2025-01-30 PROCEDURE — 73562 X-RAY EXAM OF KNEE 3: CPT | Mod: RT

## 2025-01-30 PROCEDURE — 99213 OFFICE O/P EST LOW 20 MIN: CPT

## 2025-03-14 ENCOUNTER — NON-APPOINTMENT (OUTPATIENT)
Age: 75
End: 2025-03-14

## 2025-05-12 ENCOUNTER — APPOINTMENT (OUTPATIENT)
Dept: ORTHOPEDIC SURGERY | Facility: CLINIC | Age: 75
End: 2025-05-12
Payer: MEDICARE

## 2025-05-12 VITALS
HEIGHT: 68 IN | DIASTOLIC BLOOD PRESSURE: 74 MMHG | BODY MASS INDEX: 34.1 KG/M2 | SYSTOLIC BLOOD PRESSURE: 118 MMHG | HEART RATE: 66 BPM | WEIGHT: 225 LBS

## 2025-05-12 DIAGNOSIS — Z96.651 AFTERCARE FOLLOWING JOINT REPLACEMENT SURGERY: ICD-10-CM

## 2025-05-12 DIAGNOSIS — Z96.651 PRESENCE OF RIGHT ARTIFICIAL KNEE JOINT: ICD-10-CM

## 2025-05-12 DIAGNOSIS — E11.9 TYPE 2 DIABETES MELLITUS W/OUT COMPLICATIONS: ICD-10-CM

## 2025-05-12 DIAGNOSIS — Z47.1 AFTERCARE FOLLOWING JOINT REPLACEMENT SURGERY: ICD-10-CM

## 2025-05-12 PROCEDURE — 73562 X-RAY EXAM OF KNEE 3: CPT | Mod: RT

## 2025-05-12 PROCEDURE — 99213 OFFICE O/P EST LOW 20 MIN: CPT

## 2025-05-12 PROCEDURE — G2211 COMPLEX E/M VISIT ADD ON: CPT

## 2025-05-22 ENCOUNTER — OFFICE (OUTPATIENT)
Dept: URBAN - METROPOLITAN AREA CLINIC 12 | Facility: CLINIC | Age: 75
Setting detail: OPHTHALMOLOGY
End: 2025-05-22
Payer: MEDICARE

## 2025-05-22 DIAGNOSIS — H43.393: ICD-10-CM

## 2025-05-22 DIAGNOSIS — H25.13: ICD-10-CM

## 2025-05-22 DIAGNOSIS — H35.373: ICD-10-CM

## 2025-05-22 PROCEDURE — 99204 OFFICE O/P NEW MOD 45 MIN: CPT | Performed by: OPHTHALMOLOGY

## 2025-05-22 PROCEDURE — 92250 FUNDUS PHOTOGRAPHY W/I&R: CPT | Performed by: OPHTHALMOLOGY

## 2025-05-22 ASSESSMENT — KERATOMETRY
OS_AXISANGLE_DEGREES: 081
METHOD_AUTO_MANUAL: AUTO
OS_K1POWER_DIOPTERS: 45.00
OD_AXISANGLE_DEGREES: 071
OD_K2POWER_DIOPTERS: 46.00
OS_K2POWER_DIOPTERS: 46.50
OD_K1POWER_DIOPTERS: 45.00

## 2025-05-22 ASSESSMENT — REFRACTION_CURRENTRX
OD_SPHERE: PL
OD_AXIS: 089
OS_ADD: +2.25
OD_OVR_VA: 20/
OS_CYLINDER: -0.50
OS_AXIS: 101
OS_OVR_VA: 20/
OS_VPRISM_DIRECTION: SV
OD_CYLINDER: -0.50
OD_ADD: +2.25
OD_VPRISM_DIRECTION: SV
OS_SPHERE: -0.25

## 2025-05-22 ASSESSMENT — CONFRONTATIONAL VISUAL FIELD TEST (CVF)
OS_FINDINGS: FULL
OD_FINDINGS: FULL

## 2025-05-22 ASSESSMENT — TONOMETRY
OS_IOP_MMHG: 10
OD_IOP_MMHG: 12

## 2025-05-22 ASSESSMENT — REFRACTION_AUTOREFRACTION
OD_SPHERE: PL
OD_AXIS: 127
OS_CYLINDER: -0.50
OS_SPHERE: PL
OD_CYLINDER: -0.50
OS_AXIS: 157

## 2025-05-22 ASSESSMENT — VISUAL ACUITY
OD_BCVA: 20/30-1
OS_BCVA: 20/25

## 2025-06-02 ENCOUNTER — OFFICE (OUTPATIENT)
Dept: URBAN - METROPOLITAN AREA CLINIC 88 | Facility: CLINIC | Age: 75
Setting detail: OPHTHALMOLOGY
End: 2025-06-02
Payer: MEDICARE

## 2025-06-02 DIAGNOSIS — H43.393: ICD-10-CM

## 2025-06-02 DIAGNOSIS — H35.371: ICD-10-CM

## 2025-06-02 DIAGNOSIS — H31.001: ICD-10-CM

## 2025-06-02 DIAGNOSIS — H35.3131: ICD-10-CM

## 2025-06-02 PROBLEM — H52.7 REFRACTIVE ERROR: Status: ACTIVE | Noted: 2025-05-22

## 2025-06-02 PROBLEM — H25.13 CATARACT; BOTH EYES: Status: ACTIVE | Noted: 2025-05-22

## 2025-06-02 PROCEDURE — 92235 FLUORESCEIN ANGRPH MLTIFRAME: CPT | Performed by: OPHTHALMOLOGY

## 2025-06-02 PROCEDURE — 92012 INTRM OPH EXAM EST PATIENT: CPT | Performed by: OPHTHALMOLOGY

## 2025-06-02 PROCEDURE — 92250 FUNDUS PHOTOGRAPHY W/I&R: CPT | Performed by: OPHTHALMOLOGY

## 2025-06-02 ASSESSMENT — VISUAL ACUITY
OS_BCVA: 20/25
OD_BCVA: 20/40-

## 2025-06-02 ASSESSMENT — KERATOMETRY
OS_K1POWER_DIOPTERS: 45.00
OS_K2POWER_DIOPTERS: 46.50
OS_AXISANGLE_DEGREES: 081
OD_K1POWER_DIOPTERS: 45.00
METHOD_AUTO_MANUAL: AUTO
OD_K2POWER_DIOPTERS: 46.00
OD_AXISANGLE_DEGREES: 071

## 2025-06-02 ASSESSMENT — REFRACTION_AUTOREFRACTION
OS_CYLINDER: -0.50
OS_SPHERE: PL
OD_SPHERE: PL
OD_AXIS: 127
OS_AXIS: 157
OD_CYLINDER: -0.50

## 2025-06-02 ASSESSMENT — TONOMETRY
OD_IOP_MMHG: 13
OS_IOP_MMHG: 11

## 2025-06-19 ENCOUNTER — OFFICE (OUTPATIENT)
Dept: URBAN - METROPOLITAN AREA CLINIC 12 | Facility: CLINIC | Age: 75
Setting detail: OPHTHALMOLOGY
End: 2025-06-19
Payer: MEDICARE

## 2025-06-19 DIAGNOSIS — H25.13: ICD-10-CM

## 2025-06-19 DIAGNOSIS — H25.11: ICD-10-CM

## 2025-06-19 PROCEDURE — 99213 OFFICE O/P EST LOW 20 MIN: CPT | Performed by: OPHTHALMOLOGY

## 2025-06-19 PROCEDURE — 92136 OPHTHALMIC BIOMETRY: CPT | Mod: 26,RT | Performed by: OPHTHALMOLOGY

## 2025-06-19 PROCEDURE — 92136 OPHTHALMIC BIOMETRY: CPT | Mod: TC | Performed by: OPHTHALMOLOGY

## 2025-06-19 ASSESSMENT — REFRACTION_AUTOREFRACTION
OS_SPHERE: -0.50
OD_CYLINDER: -1.00
OD_SPHERE: -0.25
OD_AXIS: 149
OS_AXIS: 157
OS_CYLINDER: -0.50

## 2025-06-19 ASSESSMENT — VISUAL ACUITY
OS_BCVA: 20/60
OD_BCVA: 20/40

## 2025-06-19 ASSESSMENT — KERATOMETRY
OD_K1POWER_DIOPTERS: 45.00
OD_AXISANGLE_DEGREES: 070
OD_K2POWER_DIOPTERS: 45.75
METHOD_AUTO_MANUAL: AUTO
OS_K2POWER_DIOPTERS: 46.00
OS_AXISANGLE_DEGREES: 131
OS_K1POWER_DIOPTERS: 45.00

## 2025-06-19 ASSESSMENT — CONFRONTATIONAL VISUAL FIELD TEST (CVF)
OD_FINDINGS: FULL
OS_FINDINGS: FULL

## 2025-06-19 ASSESSMENT — TONOMETRY
OD_IOP_MMHG: 17
OS_IOP_MMHG: 13

## 2025-06-27 ENCOUNTER — OFFICE (OUTPATIENT)
Dept: URBAN - METROPOLITAN AREA CLINIC 12 | Facility: CLINIC | Age: 75
Setting detail: OPHTHALMOLOGY
End: 2025-06-27
Payer: MEDICARE

## 2025-06-27 DIAGNOSIS — Z01.818: ICD-10-CM

## 2025-06-27 DIAGNOSIS — H25.13: ICD-10-CM

## 2025-06-27 PROBLEM — H35.371 EPIRETINAL MEMBRANE;  , RIGHT EYE: Status: ACTIVE | Noted: 2025-06-02

## 2025-06-27 PROBLEM — H31.001 CHORIORETINAL SCARS; RIGHT EYE: Status: ACTIVE | Noted: 2025-06-02

## 2025-06-27 PROBLEM — H35.3131 AGE RELATED MACULAR DEGENERATION DRY; BOTH EYES EARLY: Status: ACTIVE | Noted: 2025-06-02

## 2025-06-27 PROCEDURE — 99212 OFFICE O/P EST SF 10 MIN: CPT

## 2025-07-07 ENCOUNTER — ASC (OUTPATIENT)
Dept: URBAN - METROPOLITAN AREA SURGERY 8 | Facility: SURGERY | Age: 75
Setting detail: OPHTHALMOLOGY
End: 2025-07-07
Payer: MEDICARE

## 2025-07-07 DIAGNOSIS — H25.11: ICD-10-CM

## 2025-07-07 PROCEDURE — 66984 XCAPSL CTRC RMVL W/O ECP: CPT | Mod: RT | Performed by: OPHTHALMOLOGY

## 2025-07-08 ENCOUNTER — RX ONLY (RX ONLY)
Age: 75
End: 2025-07-08

## 2025-07-08 ENCOUNTER — OFFICE (OUTPATIENT)
Dept: URBAN - METROPOLITAN AREA CLINIC 12 | Facility: CLINIC | Age: 75
Setting detail: OPHTHALMOLOGY
End: 2025-07-08
Payer: MEDICARE

## 2025-07-08 DIAGNOSIS — Z96.1: ICD-10-CM

## 2025-07-08 DIAGNOSIS — H25.12: ICD-10-CM

## 2025-07-08 PROCEDURE — 99024 POSTOP FOLLOW-UP VISIT: CPT | Performed by: OPTOMETRIST

## 2025-07-08 ASSESSMENT — KERATOMETRY
METHOD_AUTO_MANUAL: AUTO
OD_AXISANGLE_DEGREES: 061
OD_K2POWER_DIOPTERS: 45.50
OS_K1POWER_DIOPTERS: 45.25
OS_AXISANGLE_DEGREES: 094
OD_K1POWER_DIOPTERS: 43.25
OS_K2POWER_DIOPTERS: 46.00

## 2025-07-08 ASSESSMENT — REFRACTION_AUTOREFRACTION
OS_SPHERE: -0.25
OD_SPHERE: +2.50
OS_AXIS: 122
OD_CYLINDER: -2.75
OD_AXIS: 146
OS_CYLINDER: -0.25

## 2025-07-08 ASSESSMENT — VISUAL ACUITY
OD_BCVA: 20/50
OS_BCVA: 20/50+2

## 2025-07-08 ASSESSMENT — CONFRONTATIONAL VISUAL FIELD TEST (CVF)
OD_FINDINGS: FULL
OS_FINDINGS: FULL

## 2025-07-08 ASSESSMENT — TONOMETRY
OD_IOP_MMHG: 17
OS_IOP_MMHG: 13

## 2025-07-17 ENCOUNTER — OFFICE (OUTPATIENT)
Dept: URBAN - METROPOLITAN AREA CLINIC 12 | Facility: CLINIC | Age: 75
Setting detail: OPHTHALMOLOGY
End: 2025-07-17
Payer: MEDICARE

## 2025-07-17 DIAGNOSIS — H25.12: ICD-10-CM

## 2025-07-17 PROCEDURE — 99024 POSTOP FOLLOW-UP VISIT: CPT | Mod: 26,LT | Performed by: OPHTHALMOLOGY

## 2025-07-17 ASSESSMENT — REFRACTION_AUTOREFRACTION
OD_AXIS: 136
OS_CYLINDER: -0.50
OS_SPHERE: PL
OD_CYLINDER: -1.25
OD_SPHERE: +0.75
OS_AXIS: 108

## 2025-07-17 ASSESSMENT — KERATOMETRY
METHOD_AUTO_MANUAL: AUTO
OD_AXISANGLE_DEGREES: 051
OS_K1POWER_DIOPTERS: 45.00
OD_K1POWER_DIOPTERS: 44.50
OS_AXISANGLE_DEGREES: 085
OS_K2POWER_DIOPTERS: 45.75
OD_K2POWER_DIOPTERS: 45.75

## 2025-07-17 ASSESSMENT — VISUAL ACUITY
OD_BCVA: 20/60
OS_BCVA: 20/40+

## 2025-07-17 ASSESSMENT — CONFRONTATIONAL VISUAL FIELD TEST (CVF)
OS_FINDINGS: FULL
OD_FINDINGS: FULL

## 2025-07-17 ASSESSMENT — TONOMETRY
OD_IOP_MMHG: 11
OS_IOP_MMHG: 11

## 2025-07-21 ENCOUNTER — ASC (OUTPATIENT)
Dept: URBAN - METROPOLITAN AREA SURGERY 8 | Facility: SURGERY | Age: 75
Setting detail: OPHTHALMOLOGY
End: 2025-07-21
Payer: MEDICARE

## 2025-07-21 DIAGNOSIS — H25.12: ICD-10-CM

## 2025-07-21 PROCEDURE — 66984 XCAPSL CTRC RMVL W/O ECP: CPT | Mod: 79,LT | Performed by: OPHTHALMOLOGY

## 2025-07-22 ENCOUNTER — OFFICE (OUTPATIENT)
Dept: URBAN - METROPOLITAN AREA CLINIC 12 | Facility: CLINIC | Age: 75
Setting detail: OPHTHALMOLOGY
End: 2025-07-22
Payer: MEDICARE

## 2025-07-22 DIAGNOSIS — Z96.1: ICD-10-CM

## 2025-07-22 PROCEDURE — 99024 POSTOP FOLLOW-UP VISIT: CPT | Performed by: OPTOMETRIST

## 2025-07-22 ASSESSMENT — TONOMETRY
OD_IOP_MMHG: 18
OS_IOP_MMHG: 14

## 2025-07-22 ASSESSMENT — KERATOMETRY
OD_K1POWER_DIOPTERS: 44.50
OD_K2POWER_DIOPTERS: 46.00
METHOD_AUTO_MANUAL: AUTO
OS_K1POWER_DIOPTERS: 43.75
OD_AXISANGLE_DEGREES: 53
OS_K2POWER_DIOPTERS: 44.75
OS_AXISANGLE_DEGREES: 115

## 2025-07-22 ASSESSMENT — REFRACTION_AUTOREFRACTION
OD_CYLINDER: -1.50
OD_SPHERE: +0.75
OS_CYLINDER: -0.25
OD_AXIS: 137
OS_SPHERE: +0.75
OS_AXIS: 105

## 2025-07-22 ASSESSMENT — VISUAL ACUITY
OD_BCVA: 20/20
OS_BCVA: 20/50-2

## 2025-07-22 ASSESSMENT — CONFRONTATIONAL VISUAL FIELD TEST (CVF)
OD_FINDINGS: FULL
OS_FINDINGS: FULL

## 2025-07-29 ENCOUNTER — OFFICE (OUTPATIENT)
Dept: URBAN - METROPOLITAN AREA CLINIC 12 | Facility: CLINIC | Age: 75
Setting detail: OPHTHALMOLOGY
End: 2025-07-29
Payer: MEDICARE

## 2025-07-29 ENCOUNTER — RX ONLY (RX ONLY)
Age: 75
End: 2025-07-29

## 2025-07-29 DIAGNOSIS — Z96.1: ICD-10-CM

## 2025-07-29 PROCEDURE — 99024 POSTOP FOLLOW-UP VISIT: CPT | Performed by: OPTOMETRIST

## 2025-07-29 ASSESSMENT — REFRACTION_AUTOREFRACTION
OS_SPHERE: PLANO
OD_CYLINDER: -1.25
OS_AXIS: 045
OS_CYLINDER: -0.25
OD_AXIS: 139
OD_SPHERE: +0.50

## 2025-07-29 ASSESSMENT — VISUAL ACUITY
OS_BCVA: 20/50+2
OD_BCVA: 20/25+

## 2025-07-29 ASSESSMENT — KERATOMETRY
OD_K1POWER_DIOPTERS: 44.75
OS_AXISANGLE_DEGREES: 090
METHOD_AUTO_MANUAL: AUTO
OS_K1POWER_DIOPTERS: 44.75
OD_AXISANGLE_DEGREES: 049
OD_K2POWER_DIOPTERS: 45.75
OS_K2POWER_DIOPTERS: 44.75

## 2025-07-29 ASSESSMENT — TONOMETRY: OS_IOP_MMHG: 11

## 2025-08-19 ENCOUNTER — OFFICE (OUTPATIENT)
Dept: URBAN - METROPOLITAN AREA CLINIC 12 | Facility: CLINIC | Age: 75
Setting detail: OPHTHALMOLOGY
End: 2025-08-19
Payer: MEDICARE

## 2025-08-19 DIAGNOSIS — Z96.1: ICD-10-CM

## 2025-08-19 PROCEDURE — 99024 POSTOP FOLLOW-UP VISIT: CPT | Performed by: OPHTHALMOLOGY

## 2025-08-19 ASSESSMENT — TONOMETRY
OS_IOP_MMHG: 14
OD_IOP_MMHG: 17

## 2025-08-19 ASSESSMENT — REFRACTION_AUTOREFRACTION
OD_SPHERE: +0.75
OS_CYLINDER: -0.25
OS_AXIS: 43
OS_SPHERE: -0.25
OD_CYLINDER: -1.25
OD_AXIS: 134

## 2025-08-19 ASSESSMENT — VISUAL ACUITY
OS_BCVA: 20/40-2
OD_BCVA: 20/25-2

## 2025-08-19 ASSESSMENT — KERATOMETRY
OD_AXISANGLE_DEGREES: 45
OS_AXISANGLE_DEGREES: 090
METHOD_AUTO_MANUAL: AUTO
OD_K2POWER_DIOPTERS: 45.50
OD_K1POWER_DIOPTERS: 44.75
OS_K1POWER_DIOPTERS: 45.00
OS_K2POWER_DIOPTERS: 45.00

## 2025-08-19 ASSESSMENT — CONFRONTATIONAL VISUAL FIELD TEST (CVF)
OD_FINDINGS: FULL
OS_FINDINGS: FULL

## (undated) DEVICE — SUT MONOCRYL 3-0 27" PS-2 UNDYED

## (undated) DEVICE — TAPE SILK 3"

## (undated) DEVICE — ELCTR STRYKER NEPTUNE SMOKE EVACUATION PENCIL (GREEN)

## (undated) DEVICE — DRAPE 3/4 SHEET 52X76"

## (undated) DEVICE — ZIMMER BLADE PATELLA REAMER W PILOT HOLE SZ 32

## (undated) DEVICE — ZIMMER BLADE PATELLA REAMER SIZE 29

## (undated) DEVICE — DRAPE U LONG 47X70"

## (undated) DEVICE — SUT VICRYL 0 18" CT-1 UNDYED (POP-OFF)

## (undated) DEVICE — DRAPE XL SHEET 77X98"

## (undated) DEVICE — SAW BLADE STRYKER SAGITTAL DUAL CUT 75X18X1.27MM

## (undated) DEVICE — HOOD FLYTE STRYKER SURGICOOL W PEELAWAY

## (undated) DEVICE — SYR LUER LOK 50CC

## (undated) DEVICE — WARMING BLANKET UPPER ADULT

## (undated) DEVICE — GLV 8 PROTEXIS (BLUE)

## (undated) DEVICE — SUT VICRYL 2-0 27" CT-2 UNDYED

## (undated) DEVICE — SOL IRR POUR NS 0.9% 1000ML

## (undated) DEVICE — ORTHOALIGN PLUS UNIT

## (undated) DEVICE — NDL HYPO SAFE 20G X 1.5" (YELLOW)

## (undated) DEVICE — WOUND IRR SURGIPHOR

## (undated) DEVICE — DRSG DERMABOND PRINEO 60CM

## (undated) DEVICE — SOL IRR POUR H2O 1000ML

## (undated) DEVICE — GLV 8 PROTEXIS ORTHO (BROWN)

## (undated) DEVICE — ZIMMER MIXING BOWL

## (undated) DEVICE — PACK TOTAL KNEE

## (undated) DEVICE — VENODYNE/SCD SLEEVE CALF MEDIUM

## (undated) DEVICE — ZIMMER PULSAVAC PLUS FAN KIT

## (undated) DEVICE — DRAPE 1/2 SHEET 40X57"

## (undated) DEVICE — SOL IRR BAG NS 0.9% 3000ML

## (undated) DEVICE — DRSG MEPILEX 10 X 25CM (4 X 10") POST OP AG SILVER